# Patient Record
Sex: FEMALE | Race: WHITE | Employment: UNEMPLOYED | URBAN - METROPOLITAN AREA
[De-identification: names, ages, dates, MRNs, and addresses within clinical notes are randomized per-mention and may not be internally consistent; named-entity substitution may affect disease eponyms.]

---

## 2021-02-18 ENCOUNTER — HOSPITAL ENCOUNTER (EMERGENCY)
Age: 51
Discharge: ANOTHER HOSPITAL | End: 2021-02-18
Attending: EMERGENCY MEDICINE
Payer: MEDICAID

## 2021-02-18 ENCOUNTER — APPOINTMENT (OUTPATIENT)
Dept: CT IMAGING | Age: 51
End: 2021-02-18
Payer: MEDICAID

## 2021-02-18 ENCOUNTER — APPOINTMENT (OUTPATIENT)
Dept: GENERAL RADIOLOGY | Age: 51
End: 2021-02-18
Payer: MEDICAID

## 2021-02-18 ENCOUNTER — HOSPITAL ENCOUNTER (INPATIENT)
Age: 51
LOS: 5 days | Discharge: HOME | DRG: 751 | End: 2021-02-23
Attending: PSYCHIATRY & NEUROLOGY | Admitting: PSYCHIATRY & NEUROLOGY
Payer: MEDICAID

## 2021-02-18 VITALS
DIASTOLIC BLOOD PRESSURE: 85 MMHG | TEMPERATURE: 98.8 F | SYSTOLIC BLOOD PRESSURE: 163 MMHG | RESPIRATION RATE: 16 BRPM | HEART RATE: 94 BPM | OXYGEN SATURATION: 98 %

## 2021-02-18 DIAGNOSIS — Z86.79 HISTORY OF HYPERTENSION: ICD-10-CM

## 2021-02-18 DIAGNOSIS — Z86.59 HISTORY OF POST TRAUMATIC STRESS DISORDER: ICD-10-CM

## 2021-02-18 DIAGNOSIS — F23 ACUTE PSYCHOSIS (HCC): Primary | ICD-10-CM

## 2021-02-18 LAB
ACETAMINOPHEN LEVEL: <5 UG/ML (ref 10–30)
ALBUMIN SERPL-MCNC: 4.6 G/DL (ref 3.4–5)
ALP BLD-CCNC: 124 U/L (ref 40–129)
ALT SERPL-CCNC: 26 U/L (ref 10–40)
AMPHETAMINE SCREEN, URINE: ABNORMAL
ANION GAP SERPL CALCULATED.3IONS-SCNC: 13 MMOL/L (ref 3–16)
AST SERPL-CCNC: 24 U/L (ref 15–37)
BARBITURATE SCREEN URINE: ABNORMAL
BASOPHILS ABSOLUTE: 0 K/UL (ref 0–0.2)
BASOPHILS RELATIVE PERCENT: 0.3 %
BENZODIAZEPINE SCREEN, URINE: ABNORMAL
BILIRUB SERPL-MCNC: 0.5 MG/DL (ref 0–1)
BILIRUBIN DIRECT: <0.2 MG/DL (ref 0–0.3)
BILIRUBIN URINE: NEGATIVE
BILIRUBIN, INDIRECT: NORMAL MG/DL (ref 0–1)
BLOOD, URINE: NEGATIVE
BUN BLDV-MCNC: 15 MG/DL (ref 7–20)
CALCIUM SERPL-MCNC: 9.8 MG/DL (ref 8.3–10.6)
CANNABINOID SCREEN URINE: ABNORMAL
CHLORIDE BLD-SCNC: 104 MMOL/L (ref 99–110)
CLARITY: CLEAR
CO2: 21 MMOL/L (ref 21–32)
COCAINE METABOLITE SCREEN URINE: ABNORMAL
COLOR: YELLOW
CREAT SERPL-MCNC: 1.1 MG/DL (ref 0.6–1.1)
EKG ATRIAL RATE: 113 BPM
EKG DIAGNOSIS: NORMAL
EKG P AXIS: 27 DEGREES
EKG P-R INTERVAL: 144 MS
EKG Q-T INTERVAL: 336 MS
EKG QRS DURATION: 88 MS
EKG QTC CALCULATION (BAZETT): 460 MS
EKG R AXIS: 40 DEGREES
EKG T AXIS: 50 DEGREES
EKG VENTRICULAR RATE: 113 BPM
EOSINOPHILS ABSOLUTE: 0.1 K/UL (ref 0–0.6)
EOSINOPHILS RELATIVE PERCENT: 1 %
ETHANOL: NORMAL MG/DL (ref 0–0.08)
GFR AFRICAN AMERICAN: >60
GFR NON-AFRICAN AMERICAN: 52
GLUCOSE BLD-MCNC: 160 MG/DL (ref 70–99)
GLUCOSE URINE: NEGATIVE MG/DL
HCT VFR BLD CALC: 39 % (ref 36–48)
HEMOGLOBIN: 12.7 G/DL (ref 12–16)
KETONES, URINE: NEGATIVE MG/DL
LEUKOCYTE ESTERASE, URINE: NEGATIVE
LIPASE: 18 U/L (ref 13–60)
LYMPHOCYTES ABSOLUTE: 2 K/UL (ref 1–5.1)
LYMPHOCYTES RELATIVE PERCENT: 25.8 %
Lab: ABNORMAL
MCH RBC QN AUTO: 27.3 PG (ref 26–34)
MCHC RBC AUTO-ENTMCNC: 32.7 G/DL (ref 31–36)
MCV RBC AUTO: 83.5 FL (ref 80–100)
METHADONE SCREEN, URINE: ABNORMAL
MICROSCOPIC EXAMINATION: NORMAL
MONOCYTES ABSOLUTE: 0.4 K/UL (ref 0–1.3)
MONOCYTES RELATIVE PERCENT: 4.8 %
NEUTROPHILS ABSOLUTE: 5.4 K/UL (ref 1.7–7.7)
NEUTROPHILS RELATIVE PERCENT: 68.1 %
NITRITE, URINE: NEGATIVE
OPIATE SCREEN URINE: POSITIVE
OXYCODONE URINE: ABNORMAL
PDW BLD-RTO: 14.5 % (ref 12.4–15.4)
PH UA: 7.5
PH UA: 7.5 (ref 5–8)
PHENCYCLIDINE SCREEN URINE: ABNORMAL
PLATELET # BLD: 265 K/UL (ref 135–450)
PMV BLD AUTO: 8.7 FL (ref 5–10.5)
POTASSIUM SERPL-SCNC: 3.7 MMOL/L (ref 3.5–5.1)
PROPOXYPHENE SCREEN: ABNORMAL
PROTEIN UA: NEGATIVE MG/DL
RBC # BLD: 4.67 M/UL (ref 4–5.2)
SALICYLATE, SERUM: <0.3 MG/DL (ref 15–30)
SARS-COV-2, NAAT: NOT DETECTED
SODIUM BLD-SCNC: 138 MMOL/L (ref 136–145)
SPECIFIC GRAVITY UA: 1.01 (ref 1–1.03)
TOTAL PROTEIN: 7.6 G/DL (ref 6.4–8.2)
TROPONIN: <0.01 NG/ML
URINE TYPE: NORMAL
UROBILINOGEN, URINE: 0.2 E.U./DL
WBC # BLD: 8 K/UL (ref 4–11)

## 2021-02-18 PROCEDURE — 80076 HEPATIC FUNCTION PANEL: CPT

## 2021-02-18 PROCEDURE — 6360000002 HC RX W HCPCS: Performed by: PHYSICIAN ASSISTANT

## 2021-02-18 PROCEDURE — 83690 ASSAY OF LIPASE: CPT

## 2021-02-18 PROCEDURE — 74177 CT ABD & PELVIS W/CONTRAST: CPT

## 2021-02-18 PROCEDURE — 82077 ASSAY SPEC XCP UR&BREATH IA: CPT

## 2021-02-18 PROCEDURE — 80307 DRUG TEST PRSMV CHEM ANLYZR: CPT

## 2021-02-18 PROCEDURE — 96374 THER/PROPH/DIAG INJ IV PUSH: CPT

## 2021-02-18 PROCEDURE — 81003 URINALYSIS AUTO W/O SCOPE: CPT

## 2021-02-18 PROCEDURE — 6360000004 HC RX CONTRAST MEDICATION: Performed by: PHYSICIAN ASSISTANT

## 2021-02-18 PROCEDURE — 70450 CT HEAD/BRAIN W/O DYE: CPT

## 2021-02-18 PROCEDURE — 71045 X-RAY EXAM CHEST 1 VIEW: CPT

## 2021-02-18 PROCEDURE — 96375 TX/PRO/DX INJ NEW DRUG ADDON: CPT

## 2021-02-18 PROCEDURE — 80143 DRUG ASSAY ACETAMINOPHEN: CPT

## 2021-02-18 PROCEDURE — 99284 EMERGENCY DEPT VISIT MOD MDM: CPT

## 2021-02-18 PROCEDURE — 96361 HYDRATE IV INFUSION ADD-ON: CPT

## 2021-02-18 PROCEDURE — 2580000003 HC RX 258: Performed by: PHYSICIAN ASSISTANT

## 2021-02-18 PROCEDURE — 84484 ASSAY OF TROPONIN QUANT: CPT

## 2021-02-18 PROCEDURE — 80048 BASIC METABOLIC PNL TOTAL CA: CPT

## 2021-02-18 PROCEDURE — 85025 COMPLETE CBC W/AUTO DIFF WBC: CPT

## 2021-02-18 PROCEDURE — 1240000000 HC EMOTIONAL WELLNESS R&B

## 2021-02-18 PROCEDURE — 80179 DRUG ASSAY SALICYLATE: CPT

## 2021-02-18 PROCEDURE — 93005 ELECTROCARDIOGRAM TRACING: CPT | Performed by: PHYSICIAN ASSISTANT

## 2021-02-18 RX ORDER — IRBESARTAN 150 MG/1
150 TABLET ORAL NIGHTLY
Status: ON HOLD | COMMUNITY
End: 2021-02-23 | Stop reason: SDUPTHER

## 2021-02-18 RX ORDER — MORPHINE SULFATE 2 MG/ML
2 INJECTION, SOLUTION INTRAMUSCULAR; INTRAVENOUS ONCE
Status: COMPLETED | OUTPATIENT
Start: 2021-02-18 | End: 2021-02-18

## 2021-02-18 RX ORDER — TACROLIMUS 1 MG/1
3 CAPSULE ORAL 2 TIMES DAILY
Status: ON HOLD | COMMUNITY
End: 2021-02-23 | Stop reason: SDUPTHER

## 2021-02-18 RX ORDER — ARIPIPRAZOLE 5 MG/1
5 TABLET ORAL DAILY
Status: ON HOLD | COMMUNITY
End: 2021-02-23 | Stop reason: HOSPADM

## 2021-02-18 RX ORDER — TOPIRAMATE 25 MG/1
25 TABLET ORAL 2 TIMES DAILY
Status: ON HOLD | COMMUNITY
End: 2021-02-23 | Stop reason: SDUPTHER

## 2021-02-18 RX ORDER — 0.9 % SODIUM CHLORIDE 0.9 %
1000 INTRAVENOUS SOLUTION INTRAVENOUS ONCE
Status: COMPLETED | OUTPATIENT
Start: 2021-02-18 | End: 2021-02-18

## 2021-02-18 RX ORDER — ONDANSETRON 2 MG/ML
4 INJECTION INTRAMUSCULAR; INTRAVENOUS ONCE
Status: COMPLETED | OUTPATIENT
Start: 2021-02-18 | End: 2021-02-18

## 2021-02-18 RX ORDER — VILAZODONE HYDROCHLORIDE 40 MG/1
80 TABLET ORAL DAILY
Status: ON HOLD | COMMUNITY
End: 2021-02-23 | Stop reason: SDUPTHER

## 2021-02-18 RX ORDER — AMLODIPINE BESYLATE 5 MG/1
5 TABLET ORAL DAILY
Status: ON HOLD | COMMUNITY
End: 2021-02-23 | Stop reason: SDUPTHER

## 2021-02-18 RX ADMIN — IOPAMIDOL 80 ML: 755 INJECTION, SOLUTION INTRAVENOUS at 13:26

## 2021-02-18 RX ADMIN — ONDANSETRON 4 MG: 2 INJECTION INTRAMUSCULAR; INTRAVENOUS at 13:15

## 2021-02-18 RX ADMIN — SODIUM CHLORIDE 1000 ML: 0.9 INJECTION, SOLUTION INTRAVENOUS at 13:14

## 2021-02-18 RX ADMIN — MORPHINE SULFATE 2 MG: 2 INJECTION, SOLUTION INTRAMUSCULAR; INTRAVENOUS at 13:15

## 2021-02-18 ASSESSMENT — ENCOUNTER SYMPTOMS
COUGH: 1
VOMITING: 0
NAUSEA: 1
ABDOMINAL PAIN: 1

## 2021-02-18 ASSESSMENT — SLEEP AND FATIGUE QUESTIONNAIRES
DO YOU HAVE DIFFICULTY SLEEPING: NO
DO YOU USE A SLEEP AID: NO

## 2021-02-18 ASSESSMENT — PAIN DESCRIPTION - LOCATION: LOCATION: ABDOMEN

## 2021-02-18 ASSESSMENT — LIFESTYLE VARIABLES: HISTORY_ALCOHOL_USE: NO

## 2021-02-18 ASSESSMENT — PAIN SCALES - GENERAL: PAINLEVEL_OUTOF10: 8
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
## 2021-02-19 PROCEDURE — 1240000000 HC EMOTIONAL WELLNESS R&B

## 2021-02-19 PROCEDURE — 6370000000 HC RX 637 (ALT 250 FOR IP): Performed by: PSYCHIATRY & NEUROLOGY

## 2021-02-19 PROCEDURE — 6360000002 HC RX W HCPCS: Performed by: PSYCHIATRY & NEUROLOGY

## 2021-02-19 PROCEDURE — 6370000000 HC RX 637 (ALT 250 FOR IP): Performed by: PHYSICIAN ASSISTANT

## 2021-02-19 PROCEDURE — 99222 1ST HOSP IP/OBS MODERATE 55: CPT | Performed by: PHYSICIAN ASSISTANT

## 2021-02-19 PROCEDURE — 99223 1ST HOSP IP/OBS HIGH 75: CPT | Performed by: PSYCHIATRY & NEUROLOGY

## 2021-02-19 RX ORDER — ARIPIPRAZOLE 10 MG/1
10 TABLET ORAL DAILY
Status: DISCONTINUED | OUTPATIENT
Start: 2021-02-19 | End: 2021-02-23 | Stop reason: HOSPADM

## 2021-02-19 RX ORDER — TRAZODONE HYDROCHLORIDE 50 MG/1
50 TABLET ORAL NIGHTLY PRN
Status: DISCONTINUED | OUTPATIENT
Start: 2021-02-19 | End: 2021-02-23 | Stop reason: HOSPADM

## 2021-02-19 RX ORDER — HYDROXYZINE 50 MG/1
50 TABLET, FILM COATED ORAL 3 TIMES DAILY PRN
Status: DISCONTINUED | OUTPATIENT
Start: 2021-02-19 | End: 2021-02-19 | Stop reason: CLARIF

## 2021-02-19 RX ORDER — IRBESARTAN 150 MG/1
150 TABLET ORAL NIGHTLY
Status: DISCONTINUED | OUTPATIENT
Start: 2021-02-19 | End: 2021-02-19

## 2021-02-19 RX ORDER — TACROLIMUS 1 MG/1
3 CAPSULE ORAL 2 TIMES DAILY
Status: DISCONTINUED | OUTPATIENT
Start: 2021-02-19 | End: 2021-02-23 | Stop reason: HOSPADM

## 2021-02-19 RX ORDER — MAGNESIUM HYDROXIDE/ALUMINUM HYDROXICE/SIMETHICONE 120; 1200; 1200 MG/30ML; MG/30ML; MG/30ML
30 SUSPENSION ORAL EVERY 6 HOURS PRN
Status: DISCONTINUED | OUTPATIENT
Start: 2021-02-19 | End: 2021-02-23 | Stop reason: HOSPADM

## 2021-02-19 RX ORDER — IBUPROFEN 400 MG/1
400 TABLET ORAL EVERY 6 HOURS PRN
Status: DISCONTINUED | OUTPATIENT
Start: 2021-02-19 | End: 2021-02-23 | Stop reason: HOSPADM

## 2021-02-19 RX ORDER — LOSARTAN POTASSIUM 25 MG/1
50 TABLET ORAL NIGHTLY
Status: DISCONTINUED | OUTPATIENT
Start: 2021-02-19 | End: 2021-02-23 | Stop reason: HOSPADM

## 2021-02-19 RX ORDER — VILAZODONE HYDROCHLORIDE 40 MG/1
80 TABLET ORAL DAILY
Status: DISCONTINUED | OUTPATIENT
Start: 2021-02-19 | End: 2021-02-23 | Stop reason: HOSPADM

## 2021-02-19 RX ORDER — OLANZAPINE 10 MG/1
10 TABLET ORAL
Status: ACTIVE | OUTPATIENT
Start: 2021-02-19 | End: 2021-02-19

## 2021-02-19 RX ORDER — BENZTROPINE MESYLATE 1 MG/ML
2 INJECTION INTRAMUSCULAR; INTRAVENOUS 2 TIMES DAILY PRN
Status: DISCONTINUED | OUTPATIENT
Start: 2021-02-19 | End: 2021-02-23 | Stop reason: HOSPADM

## 2021-02-19 RX ORDER — ACETAMINOPHEN 325 MG/1
650 TABLET ORAL EVERY 4 HOURS PRN
Status: DISCONTINUED | OUTPATIENT
Start: 2021-02-19 | End: 2021-02-23 | Stop reason: HOSPADM

## 2021-02-19 RX ORDER — HYDROXYZINE HYDROCHLORIDE 10 MG/1
50 TABLET, FILM COATED ORAL 3 TIMES DAILY PRN
Status: DISCONTINUED | OUTPATIENT
Start: 2021-02-19 | End: 2021-02-23 | Stop reason: HOSPADM

## 2021-02-19 RX ORDER — OLANZAPINE 10 MG/1
10 INJECTION, POWDER, LYOPHILIZED, FOR SOLUTION INTRAMUSCULAR
Status: ACTIVE | OUTPATIENT
Start: 2021-02-19 | End: 2021-02-19

## 2021-02-19 RX ORDER — TOPIRAMATE 25 MG/1
25 TABLET ORAL 2 TIMES DAILY
Status: DISCONTINUED | OUTPATIENT
Start: 2021-02-19 | End: 2021-02-23 | Stop reason: HOSPADM

## 2021-02-19 RX ORDER — AMLODIPINE BESYLATE 5 MG/1
5 TABLET ORAL DAILY
Status: DISCONTINUED | OUTPATIENT
Start: 2021-02-19 | End: 2021-02-19

## 2021-02-19 RX ORDER — TACROLIMUS 1 MG/1
3 CAPSULE ORAL 2 TIMES DAILY
Status: DISCONTINUED | OUTPATIENT
Start: 2021-02-19 | End: 2021-02-19

## 2021-02-19 RX ORDER — AMLODIPINE BESYLATE 5 MG/1
5 TABLET ORAL DAILY
Status: DISCONTINUED | OUTPATIENT
Start: 2021-02-19 | End: 2021-02-23 | Stop reason: HOSPADM

## 2021-02-19 RX ADMIN — VILAZODONE HYDROCHLORIDE 80 MG: 40 TABLET ORAL at 12:29

## 2021-02-19 RX ADMIN — AMLODIPINE BESYLATE 5 MG: 5 TABLET ORAL at 12:29

## 2021-02-19 RX ADMIN — LOSARTAN POTASSIUM 50 MG: 25 TABLET, FILM COATED ORAL at 20:56

## 2021-02-19 RX ADMIN — TRAZODONE HYDROCHLORIDE 50 MG: 50 TABLET ORAL at 20:55

## 2021-02-19 RX ADMIN — ARIPIPRAZOLE 10 MG: 10 TABLET ORAL at 12:29

## 2021-02-19 RX ADMIN — TACROLIMUS 3 MG: 1 CAPSULE ORAL at 20:55

## 2021-02-19 RX ADMIN — TOPIRAMATE 25 MG: 25 TABLET, FILM COATED ORAL at 12:29

## 2021-02-19 RX ADMIN — TOPIRAMATE 25 MG: 25 TABLET, FILM COATED ORAL at 20:55

## 2021-02-19 RX ADMIN — TACROLIMUS 3 MG: 1 CAPSULE ORAL at 15:58

## 2021-02-19 ASSESSMENT — SLEEP AND FATIGUE QUESTIONNAIRES
DO YOU USE A SLEEP AID: NO
AVERAGE NUMBER OF SLEEP HOURS: 7
DO YOU HAVE DIFFICULTY SLEEPING: NO
AVERAGE NUMBER OF SLEEP HOURS: 6

## 2021-02-19 ASSESSMENT — LIFESTYLE VARIABLES: HISTORY_ALCOHOL_USE: NO

## 2021-02-19 ASSESSMENT — PAIN SCALES - GENERAL: PAINLEVEL_OUTOF10: 0

## 2021-02-19 NOTE — FLOWSHEET NOTE
02/19/21 0843   Activities of Daily Living   Patient Requires assistance with daily self-care activities? No   Leisure Activity 1   3 Favorite Leisure Activities \"Go for long walks\"   Frequency weekly   Last time can't remember   Barriers to participating  motivation  (Weather permitting)   Leisure Activity 2   [address]  \"Reading\"   Frequency  <2 hours/day   Last time  this week   Leisure Activity 3   [address]  \"Cooking\"   Frequency  <2 hours/day   Last time  this week   Social   Patient reports spending the majority of their free time with a group   Patient verbalizes a preference for spending free time with a group   Patients perception of support system more healthy   Patients perception of barriers to socializing with others include(s) no perceived barriers   Social Details Support System: \"my two daughters\"   Beliefs & Coping   Has difficulty dealing with feelings   No   Internalizes feelings/Keeps feelings in No   Externalizes feelings through aggressiveness or poor temper control  No   Feels uncomfortable around others  No   Has difficulty talking to others  No   Depends on others for direction or decisions No   Difficulty dealing with anger of others  No   Difficulty dealing with own anger  No   Difficulty managing stress No   Frequently has difficulty with relationships  No   Has recently perceived/experienced loss, disappointment, humiliation or failure  Yes   General perception about self likes self   Attitude about abilities occasionally fails   Locus of Control  always   Belief about recovery I don't have an illness/problem   Patient Identified Strengths  \"I listen to people and I don't \"   Patient Identified Limitations  \"I'm struggling with trusting people\"   Perception of most stressful event prior to hospitalization \"I don't know why I'm here. I already have a therapist and psychiatrist I see at home. \"   Perception of changes needed \"I don't know\" Strengths and Limitations   Strengths Independent in basic self-care activities; Positive support network;Demonstrates basic social skills   Limitations Tendency to isolate self     CTRS completed pt's AT/OT Leisure Assessment.     Maria A Zarco, CTRS

## 2021-02-19 NOTE — FLOWSHEET NOTE
21 1028   Psychiatric History   Psychiatric history treatment Current treatment   Are there any medication issues? No   Support System   Support system Adequate   Types of Support System Other (Comment)  (daughter)   Problems in support system None   Current Living Situation   Home Living Adequate   Living information Lives with others  (pt lives in [place] and was visiting a daughter that lives here. pt lives with another daughter in [place] and her . grand daughter also lives there)   Problems with living situation  No   Lack of basic needs No   SSDI/SSI none   Other government assistance none   Problems with environment no issues   Current abuse issues none   Supervised setting None   Relationship problems No   Medical and Self-Care Issues   Relevant medical problems arthritis   Relevant self-care issues no issues   Barriers to treatment No  (pt has a therapist in [place]. she is in process of changing practices so pt needs to reconnect with her)   Family Constellation   Spouse/partner-name/age  for 6 years this april   Children-names/ages 3 daughters that are all grown and    Parents . mom  when pt was becoming a teenager   Siblings 2 brothers in [facility] and another brother that passed away 5 years ago   Support services Agency involved(Comment)   Comment pt has a therapist in [place] where pt lives   Childhood   Raised by Biological father   Biological mother pt  when pt teenager   Biological father raised by father and by mother's aunt   Relevant family history pt was born and raised in [place].  pt was  in [place] and then moved to [place]   History of abuse No   Legal History   Legal history No   Other relevant legal issues none   Juvenile legal history No    Abuse Assessment   Verbal Abuse Denies   Emotional abuse Denies   Financial Abuse Denies   Sexual abuse Denies   Elder abuse No   Substance Use   Use of substances  No   Education Education HS graduate -GED  (finished HS in the states. pt has 2 years of college)   Special education   (none)   Work History   Currently employed No   Recent job loss or change   (none)    service   (none)   /VA involvement none   Leisure/Activity   Past interests gardening, cooking, reading   Present interests same   Current daily activity walk at least 2 times a week, reading, cooking   Social with friends/family Yes   Cultural and Spiritual   Spiritual concerns No   Cultural concerns No   Comment pt was born in [facility] met with pt and completed assessments. Pt reported that she is from [facility] where she lives with one daughter and she is here visiting with another daughter. She reported that she does not think that she made it to her house and she is confused to why she is here. Pt thinks she was having confusion on the plane. Pt reported that she is supposed to be visiting for 2 weeks and leaving March 2nd. Pt reported that she has had depression in the past since a teen ager. She reported that she has a therapist and psychiatrist in [place]. Pt reported that her psychiatrist has tested her for dementia but she has tested negative. Pt reported that there is nothing new or different that she can remember or stressors. Pt reported that she is confused and having difficulty remembering.  Pt denies SI.

## 2021-02-19 NOTE — BH NOTE
hospitals  was invited and encouraged to attend [facility]. Pt did not attend.     PAIGE Cuevas

## 2021-02-19 NOTE — H&P
Hospital Medicine History & Physical      PCP: No primary care provider on file. Date of Admission: 2/18/2021    Date of Service: Pt seen/examined on 2/19/2021    Chief Complaint:  No chief complaint on file. History Of Present Illness: The patient is a 45 y.o. female with a PMH of Anxiety, Depression, HTN and RA who presented to [facility] for acute psychosis. Patient was seen and evaluated in the ED by the ED medical provider, patient was medically cleared for admission to [facility] at [facility]. This note serves as an admission medical H&P.   PCP: unable to recall provider name - pt's PCP is in [place], [place]  Tobacco Use: None  EtOH Use: None  Illicit Drug Use: None, UDS + opiates    Pt denies any medical concerns at this time. Past Medical History:        Diagnosis Date    Anxiety     Depression     Hypertension     PTSD (post-traumatic stress disorder)     RA (rheumatoid arthritis) ([facility] [address]        Past Surgical History:        Procedure Laterality Date    CHOLECYSTECTOMY      HYSTERECTOMY         Medications Prior to Admission:    Prior to Admission medications    Medication Sig Start Date End Date Taking? Authorizing Provider   irbesartan (AVAPRO) 150 MG tablet Take 150 mg by mouth nightly    Historical Provider, MD   vilazodone HCl (VILAZODONE HCL) 40 MG TABS Take 80 mg by mouth daily    Historical Provider, MD   topiramate (TOPAMAX) 25 MG tablet Take 25 mg by mouth 2 times daily    Historical Provider, MD   ARIPiprazole (ABILIFY) 5 MG tablet Take 5 mg by mouth daily    Historical Provider, MD   amLODIPine (NORVASC) 5 MG tablet Take 5 mg by mouth daily    Historical Provider, MD   tacrolimus (PROGRAF) 1 MG capsule Take 3 capsules by mouth 2 times daily    Historical Provider, MD       Allergies:  Patient has no known allergies. Social History:  The patient currently lives with her daughter and son in law. TOBACCO:   reports that she has never smoked.  She has never used smokeless tobacco.  ETOH:   reports previous alcohol use. Family History:   Positive as follows:        Problem Relation Age of Onset    Cancer Mother     Cancer Brother     Cancer Maternal Grandmother     Alcohol Abuse Maternal Grandfather        REVIEW OF SYSTEMS:     Constitutional: Negative for fever   HENT: Negative for sore throat   Eyes: Negative for redness   Respiratory: Negative  for dyspnea, cough   Cardiovascular: Negative for chest pain   Gastrointestinal: Negative for vomiting, diarrhea   Genitourinary: Negative for hematuria   Musculoskeletal: Negative for arthralgias   Skin: Negative for rash   Neurological: Negative for syncope   Hematological: Negative for adenopathy   Psychiatric/Behavorial: Negative for anxiety    PHYSICAL EXAM:    BP (!) 143/95   Pulse 108   Temp 97.8 °F (36.6 °C) (Oral)   Resp 16   Ht 5' 4\" (1.626 m)   Wt 200 lb (90.7 kg)   LMP  (LMP Unknown)   SpO2 98%   Breastfeeding No   BMI 34.33 kg/m²   Gen: No distress. Alert. Middle aged female  Eyes: PERRL. No sclera icterus. No conjunctival injection. ENT: No discharge. Pharynx clear. Neck:  Trachea midline. Resp: No accessory muscle use. No crackles. No wheezes. No rhonchi. On RA  CV: Regular rate. Regular rhythm. No murmur. No rub. No edema. GI: Soft, Non-tender. Non-distended. Normal bowel sounds. Skin: Warm and dry. No rash on exposed extremities. M/S: No cyanosis. No clubbing. Neuro: Awake.  Grossly nonfocal, CN II-XII intact    Psych: Defer to psychiatry    CBC:   Recent Labs     02/18/21  1229   WBC 8.0   HGB 12.7   HCT 39.0   MCV 83.5        BMP:   Recent Labs     02/18/21  1229      K 3.7      CO2 21   BUN 15   CREATININE 1.1     LIVER PROFILE:   Recent Labs     02/18/21  1229   AST 24   ALT 26   LIPASE 18.0   BILIDIR <0.2   BILITOT 0.5   ALKPHOS 124     UA:  Recent Labs     02/18/21  1352   COLORU Yellow   PHUR 7.5  7.5   CLARITYU Clear   SPECGRAV 1.015   LEUKOCYTESUR Negative UROBILINOGEN 0.2   BILIRUBINUR Negative   BLOODU Negative   GLUCOSEU Negative        CARDIAC ENZYMES  Recent Labs     02/18/21  1229   TROPONINI <0.01       U/A:    Lab Results   Component Value Date    COLORU Yellow 02/18/2021    CLARITYU Clear 02/18/2021    SPECGRAV 1.015 02/18/2021    LEUKOCYTESUR Negative 02/18/2021    BLOODU Negative 02/18/2021    GLUCOSEU Negative 02/18/2021     CULTURES  None    EKG:  I have reviewed the EKG with the following interpretation:   Sinus tachycardia rate of 113  No prior EKGs available for review    RADIOLOGY  None    Pertinent previous results reviewed     CT head 2/18/2021  1. No intracranial hemorrhage, mass effect or large acute territorial infarction    CT abdomen 2/18/2021  1. No acute abnormality of the abdomen or pelvis. 2. Surgical absence of the gallbladder. 3. Normal appendix. 4. Hepatic steatosis. CXR 2/18/2021  No acute cardiopulmonary abnormality. ASSESSMENT/PLAN:    Acute Psychosis  - cont mgmt per [facility]    HTN  - stable  - cont Amlodipine and Irbesartan    Rheumatoid Arthritis  - no flares  - cont Tacrolimus  - on Kevzara IM every 2 weeks    Obesity  - Body mass index is 34.33 kg/m². - Counseled on weight loss. Pt has no medical complaints at this time. Pt was informed that they may have [facility] contact us should any medical concerns arise during this admission.     Candice Pen PA-C 11:40 AM 2/19/2021

## 2021-02-19 NOTE — PROGRESS NOTES
Behavioral Services  Medicare Certification Upon Admission    I certify that this patient's inpatient psychiatric hospital admission is medically necessary for:   X (1) Treatment which could reasonably be expected to improve this patient's condition,      X (2) Or for diagnostic study;     AND    X (2) The inpatient psychiatric services are provided while the individual is under the care of a physician and are included in the individualized plan of care.     Estimated length of stay/service 2-3 days     Plan for post-hospital care outpt tx    Electronically signed by Annalisa Choi MD on 2/19/2021 at 11:51 AM

## 2021-02-19 NOTE — ED NOTES
EMTALA documents and Hold documents completed and sent with patient.      Marian Child RN  02/18/21 5691
Manan lantigua took report, explained pt background and evaluation, no further question asked     Gloria Alonso RN  02/18/21 0319
Patient transferred to [facility] with [address] [facility].       Dakotah Cardona RN  02/18/21 4694
.
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
## 2021-02-19 NOTE — BH NOTE
..   `[facility]  Admission Note   Arrived on unit per stretch with 2 ambulance attendants at approx 2300. Admission Type:   Admission Type:  Involuntary    Reason for admission:  Reason for Admission: confusion, possible hallucinations/psychosis    PATIENT STRENGTHS:  Strengths: Communication, Social Skills, Medication Compliance, Positive Support    Patient Strengths and Limitations:  Limitations: Tendency to isolate self, Lacks leisure interests    Addictive Behavior:   Addictive Behavior  In the past 3 months, have you felt or has someone told you that you have a problem with:  : None  Do you have a history of Chemical Use?: No  Do you have a history of Alcohol Use?: No  Do you have a history of Street Drug Abuse?: No  Histroy of Prescripton Drug Abuse?: No    Medical Problems:   Past Medical History:   Diagnosis Date    Anxiety     Depression     Hypertension     PTSD (post-traumatic stress disorder)     RA (rheumatoid arthritis) ([facility] [address]        Status EXAM:  Status and Exam  Normal: No  Facial Expression: Worried, Sad  Affect: Blunt  Level of Consciousness: Alert  Mood:Normal: No  Mood: Anxious, Labile(at times laughs inappropriately)  Motor Activity:Normal: Yes  Interview Behavior: Cooperative  Preception: [place] [employer] to Person, [employer] to Time, [place] [employer] to Place  Attention:Normal: No  Attention: Distractible, Unable to Concentrate  Thought Processes: (linear)  Thought Content:Normal: No  Thought Content: Paranoia, Preoccupations  Hallucinations: None(denies, but appears to RTIS)  Delusions: Yes  Delusions: Persecution  Memory:Normal: No  Memory: Poor Recent  Insight and Judgment: No  Present Suicidal Ideation: No  Present Homicidal Ideation: No    Tobacco Screening:  Practical Counseling, on admission, aleisha X, if applicable and completed (first 3 are required if patient doesn't refuse):            ( )  Recognizing danger situations (included triggers and roadblocks)                    ( )  Coping

## 2021-02-20 LAB — TSH SERPL DL<=0.05 MIU/L-ACNC: 0.51 UIU/ML (ref 0.27–4.2)

## 2021-02-20 PROCEDURE — 84443 ASSAY THYROID STIM HORMONE: CPT

## 2021-02-20 PROCEDURE — 1240000000 HC EMOTIONAL WELLNESS R&B

## 2021-02-20 PROCEDURE — 6370000000 HC RX 637 (ALT 250 FOR IP): Performed by: PSYCHIATRY & NEUROLOGY

## 2021-02-20 PROCEDURE — 80061 LIPID PANEL: CPT

## 2021-02-20 PROCEDURE — 6370000000 HC RX 637 (ALT 250 FOR IP): Performed by: PHYSICIAN ASSISTANT

## 2021-02-20 PROCEDURE — 6360000002 HC RX W HCPCS: Performed by: PSYCHIATRY & NEUROLOGY

## 2021-02-20 PROCEDURE — 36415 COLL VENOUS BLD VENIPUNCTURE: CPT

## 2021-02-20 PROCEDURE — 83036 HEMOGLOBIN GLYCOSYLATED A1C: CPT

## 2021-02-20 PROCEDURE — 99233 SBSQ HOSP IP/OBS HIGH 50: CPT | Performed by: PSYCHIATRY & NEUROLOGY

## 2021-02-20 RX ADMIN — TOPIRAMATE 25 MG: 25 TABLET, FILM COATED ORAL at 21:29

## 2021-02-20 RX ADMIN — LOSARTAN POTASSIUM 50 MG: 25 TABLET, FILM COATED ORAL at 21:29

## 2021-02-20 RX ADMIN — TACROLIMUS 3 MG: 1 CAPSULE ORAL at 08:54

## 2021-02-20 RX ADMIN — VILAZODONE HYDROCHLORIDE 80 MG: 40 TABLET ORAL at 08:55

## 2021-02-20 RX ADMIN — TOPIRAMATE 25 MG: 25 TABLET, FILM COATED ORAL at 08:55

## 2021-02-20 RX ADMIN — ARIPIPRAZOLE 10 MG: 10 TABLET ORAL at 08:55

## 2021-02-20 RX ADMIN — AMLODIPINE BESYLATE 5 MG: 5 TABLET ORAL at 08:55

## 2021-02-20 RX ADMIN — TACROLIMUS 3 MG: 1 CAPSULE ORAL at 21:29

## 2021-02-20 NOTE — PLAN OF CARE
Problem: Altered Mood, Depressive Behavior:  Goal: Able to verbalize and/or display a decrease in depressive symptoms  Description: Able to verbalize and/or display a decrease in depressive symptoms  2/20/2021 0917 by Dyana Walker LPN  Outcome: Ongoing  Note: Continues to be withdrawn and not participate in therapy     Problem: Altered Mood, Depressive Behavior:  Goal: Able to verbalize support systems  Description: Able to verbalize support systems  2/20/2021 0917 by Dyana Walker LPN  Outcome: Met This Shift  Note: Daughter supportive and has called to talk with Corrie. Problem: Altered Mood, Psychotic Behavior:  Goal: Able to demonstrate trust by eating, participating in treatment and following staff's direction  Description: Able to demonstrate trust by eating, participating in treatment and following staff's direction  Outcome: Ongoing  Note: Withdrawn to assigned room. Out of room to retreive meal and ate in room then returned tray to cart. Diet and fluids taken good. Medication compliant. Did not attend group but laid in bed to sleep after morning meal. Did attend group after meeting with provider. Eye contact good during interaction. Voice tone even, Not giddy.

## 2021-02-20 NOTE — GROUP NOTE
Group Therapy Note    Date: 2/19/2021    Group Start Time: 2010  Group End Time: 2040  Group Topic: Wrap-Up    [facility]        Group Therapy Note    Attendees: Goals and importance of goal setting discussed. Night time milieu activities discussed. Patient's Goal:  Sleep     Notes:  Successful     Status After Intervention:  Improved    Participation Level:  Active Listener and Interactive    Participation Quality: Appropriate and Attentive      Speech:  normal      Thought Process/Content: Logical  Linear      Affective Functioning: Congruent      Mood: depressed      Level of consciousness:  Alert and Oriented x4      Response to Learning: Progressing to goal      Endings: None Reported    Modes of Intervention: Support      Discipline Responsible: Behavorial Health Tech      Signature:  Eduin Esquivel

## 2021-02-20 NOTE — GROUP NOTE
Group Therapy Note    Date: 2/20/2021    Group Start Time: 1330  Group End Time: 1430  Group Topic: Psychoeducation    [address] [facility] [place]    Group Therapy Note    Attendees: 10    Patients learned about the benefits of practicing gratitude on mental, physical, and psychological health. Patients discussed and learn about ways to practice gratitude to increased positive thinking. Patients engaged in a self directed positive mindfulness activity at the end of group and discussed ways they could start to incorporate gratitude into their lives. Notes:  Pt was engaged in group and participated fully in group discussion. Status After Intervention:  Improved    Participation Level:  Active Listener and Interactive    Participation Quality: Appropriate and Attentive      Speech:  normal      Thought Process/Content: Linear      Affective Functioning: Constricted/Restricted      Mood: anxious and depressed      Level of consciousness:  Alert and Oriented x4      Response to Learning: Able to verbalize current knowledge/experience, Able to verbalize/acknowledge new learning and Able to retain information      Endings: None Reported    Modes of Intervention: Education, Support, Socialization, Exploration, Clarifying, Problem-solving and Activity      Discipline Responsible: /Counselor      Signature:  RICARDO Shelby-S, R-BHAVIK

## 2021-02-20 NOTE — PROGRESS NOTES
Department of Psychiatry  AttendingProgress Note  Chief Complaint: psychosis  Nurys Baugh has been cooperative in program. She stated that she slept well and is eating. She has had contact with both daughters. Going to groups and appears to interact with peers. Feels safe here and no SI/HI nor A/V baldwin  She doesn't appear to have much insight into her illness and she appears unconcerned. Patient's chart was reviewed and collaborated with  about the treatment plan. SUBJECTIVE:    Patient is feeling better. Suicidal ideation:  denies suicidal ideation. Patient does not have medication side effects. ROS: Patient has new complaints: no  Sleeping adequately:  Yes   Appetite adequate: Yes  Attending groups: Yes  Visitors:No    OBJECTIVE    Physical  VITALS:  /64   Pulse 94   Temp 98.2 °F (36.8 °C) (Temporal)   Resp 18   Ht 5' 4\" (1.626 m)   Wt 200 lb (90.7 kg)   LMP  (LMP Unknown)   SpO2 97%   Breastfeeding No   BMI 34.33 kg/m²     Mental Status Examination:  Patients appearance was street clothes. Thoughts are [place]. Homicidal ideations none. No abnormal movements, tics or mannerisms. Memory intact Aims 0. Concentration Fair. Alert and oriented X 4. Insight and Judgement impaired insight. Patient was cooperative.  Patient gait normal. Mood constricted, affect flat affect Hallucinations Absent, suicidal ideations no specific plan to harm self Speech soft  Data  Labs:   Admission on 02/18/2021   Component Date Value Ref Range Status    TSH 02/20/2021 0.51  0.27 - 4.20 uIU/mL Final            Medications  Current Facility-Administered Medications: influenza quadrivalent split vaccine (FLUZONE;FLUARIX;FLULAVAL;AFLURIA) injection 0.5 mL, 0.5 mL, Intramuscular, Prior to discharge  acetaminophen (TYLENOL) tablet 650 mg, 650 mg, Oral, Q4H PRN  ibuprofen (ADVIL;MOTRIN) tablet 400 mg, 400 mg, Oral, Q6H PRN  magnesium hydroxide (MILK OF MAGNESIA) 400 MG/5ML suspension 30 mL, 30 mL, Oral, Daily PRN  aluminum & magnesium hydroxide-simethicone (MAALOX) 200-200-20 MG/5ML suspension 30 mL, 30 mL, Oral, Q6H PRN  sterile water injection 2.1 mL, 2.1 mL, Intramuscular, Q4H PRN  traZODone (DESYREL) tablet 50 mg, 50 mg, Oral, Nightly PRN  benztropine mesylate (COGENTIN) injection 2 mg, 2 mg, Intramuscular, BID PRN  hydrOXYzine (ATARAX) tablet 50 mg, 50 mg, Oral, TID PRN  amLODIPine (NORVASC) tablet 5 mg, 5 mg, Oral, Daily  losartan (COZAAR) tablet 50 mg, 50 mg, Oral, Nightly  ARIPiprazole (ABILIFY) tablet 10 mg, 10 mg, Oral, Daily  tacrolimus (PROGRAF) capsule 3 mg, 3 mg, Oral, BID  topiramate (TOPAMAX) tablet 25 mg, 25 mg, Oral, BID  vilazodone HCl (VIIBRYD) TABS 80 mg, 80 mg, Oral, Daily    ASSESSMENT AND PLAN    Principal Problem:    Acute psychosis ([facility] [address]  Active Problems:    Essential hypertension    Rheumatoid arthritis in remission ([facility] [address]    Obesity (BMI 30.0-34. 9)  Resolved Problems:    * No resolved hospital problems. *       1. Patient s symptoms   are improving. Abilify 10 mg QD  2. Probable discharge is next week  3. Discharge planning is incomplete  4. Suicidal ideation is none  5. Total time with patient was 40 minutes and more than 50 % of that time was spent counseling the patient on their symptoms, treatment and expected goals.

## 2021-02-21 LAB
CHOLESTEROL, TOTAL: 165 MG/DL (ref 0–199)
ESTIMATED AVERAGE GLUCOSE: 137 MG/DL
HBA1C MFR BLD: 6.4 %
HDLC SERPL-MCNC: 41 MG/DL (ref 40–60)
LDL CHOLESTEROL CALCULATED: 100 MG/DL
TRIGL SERPL-MCNC: 119 MG/DL (ref 0–150)
VLDLC SERPL CALC-MCNC: 24 MG/DL

## 2021-02-21 PROCEDURE — 1240000000 HC EMOTIONAL WELLNESS R&B

## 2021-02-21 PROCEDURE — 6360000002 HC RX W HCPCS: Performed by: PSYCHIATRY & NEUROLOGY

## 2021-02-21 PROCEDURE — 6370000000 HC RX 637 (ALT 250 FOR IP): Performed by: PSYCHIATRY & NEUROLOGY

## 2021-02-21 PROCEDURE — 6370000000 HC RX 637 (ALT 250 FOR IP): Performed by: PHYSICIAN ASSISTANT

## 2021-02-21 RX ADMIN — VILAZODONE HYDROCHLORIDE 80 MG: 40 TABLET ORAL at 09:52

## 2021-02-21 RX ADMIN — TOPIRAMATE 25 MG: 25 TABLET, FILM COATED ORAL at 21:20

## 2021-02-21 RX ADMIN — LOSARTAN POTASSIUM 50 MG: 25 TABLET, FILM COATED ORAL at 21:20

## 2021-02-21 RX ADMIN — AMLODIPINE BESYLATE 5 MG: 5 TABLET ORAL at 09:52

## 2021-02-21 RX ADMIN — TACROLIMUS 3 MG: 1 CAPSULE ORAL at 09:53

## 2021-02-21 RX ADMIN — ARIPIPRAZOLE 10 MG: 10 TABLET ORAL at 09:52

## 2021-02-21 RX ADMIN — TOPIRAMATE 25 MG: 25 TABLET, FILM COATED ORAL at 09:52

## 2021-02-21 RX ADMIN — TACROLIMUS 3 MG: 1 CAPSULE ORAL at 21:20

## 2021-02-21 NOTE — GROUP NOTE
Group Therapy Note    Date: 2/21/2021    Group Start Time: 1030  Group End Time: 1200  Group Topic: [facility]        Group Therapy Note    Music Therapy group consisted of heaven analysis intervention and creative arts intervention. Therapist facilitated group discussion of perspectives, challenges to positive thinking and coping. Following verbal processing pts used music and art to create a window of life, with each pane representing a different part: past, present, future view of self, and anticipated future view from others. Attendees: 10         Notes:  Pt present across group interventions, positively social and collaborative with peers in group processing of healthy distraction in coping, maintaining focus on positive elements of daily life. During creative arts intervention, pt showed in the present that her Arelia Deter is covered and fighting to shine\", mentioning role of family support in maintaining positive perspective. Status After Intervention:  Improved    Participation Level:  Active Listener and Interactive    Participation Quality: Appropriate, Sharing and Supportive      Speech:  normal      Thought Process/Content: Logical  Linear      Affective Functioning: Congruent      Mood: depressed      Level of consciousness:  Alert and Oriented x4      Response to Learning: Able to verbalize current knowledge/experience, Able to verbalize/acknowledge new learning, Capable of insight and Progressing to goal      Endings: None Reported    Modes of Intervention: Support, Socialization, Exploration, Clarifying, Activity and Media      Discipline Responsible: Psychoeducational Specialist      Signature:  Sveta Candelaria MM, MT-BC

## 2021-02-21 NOTE — GROUP NOTE
Group Therapy Note    Date: 2/21/2021    Group Start Time: 1330  Group End Time: 1430  Group Topic: Psychoeducation    [address]    Group Therapy Note    Attendees: 10    Patients learned about values and goals; discussed how mental illness impacts values and goals. Patients were provided psychoeducation handouts on SMART goal setting and discussed setting SMART goals as a group. Patients discussed importance of setting both long term and short term goals during group. Therapist encouraged patients to use goal setting worksheet during group. Notes:  Pt was engaged in group discussion; pt appeared to relate and connect with peers during group. Pt offered supportive feedback to peer during group. Status After Intervention:  Improved    Participation Level:  Active Listener and Interactive    Participation Quality: Appropriate      Speech:  normal      Thought Process/Content: Logical  Linear      Affective Functioning: Constricted/Restricted      Mood: depressed      Level of consciousness:  Alert and Oriented x4      Response to Learning: Able to verbalize current knowledge/experience, Able to verbalize/acknowledge new learning and Able to retain information      Endings: None Reported    Modes of Intervention: Education, Support, Socialization, Exploration, Clarifying, Problem-solving and Activity      Discipline Responsible: /Counselor      Signature:  RICARDO Salazar-S, R-LIDIAT

## 2021-02-21 NOTE — BH NOTE
[facility]  Day 3 Interdisciplinary Treatment Plan NOTE    Review Date & Time: 02/21/21 0900    Patient was not in treatment team    Admission Type:   Admission Type: Involuntary    Reason for admission:  Reason for Admission: confusion, possible hallucinations/psychosis  Estimated Length of Stay Update:  1-3 days   Estimated Discharge Date Update: 1-3 days     PATIENT STRENGTHS:  Patient Strengths Strengths: Communication, Connection to output provider, Positive Support, Medication Compliance  Patient Strengths and Limitations:Limitations: Difficulty problem solving/relies on others to help solve problems, External locus of control  Addictive Behavior:Addictive Behavior  In the past 3 months, have you felt or has someone told you that you have a problem with:  : None  Do you have a history of Chemical Use?: No  Do you have a history of Alcohol Use?: No  Do you have a history of Street Drug Abuse?: No  Histroy of Prescripton Drug Abuse?: No  Medical Problems:  Past Medical History:   Diagnosis Date    Anxiety     Depression     Hypertension     PTSD (post-traumatic stress disorder)     RA (rheumatoid arthritis) ([facility] [address]        Risk:  Fall RiskTotal: 65  Jose Enrique Scale Jose Enrique Scale Score: 22  BVC Total: 0  Change in scores NO.  Changes to plan of Care  No    Status EXAM:   Status and Exam  Normal: No  Facial Expression: Flat, Worried  Affect: Congruent  Level of Consciousness: Alert  Mood:Normal: No  Mood: Depressed  Motor Activity:Normal: No  Motor Activity: Decreased  Interview Behavior: Cooperative  Preception: [place] to Person, [place] to Time, [place] to Place, [place] to Situation  Attention:Normal: Yes  Attention: (Has been attentive during groups and less preoccupied)  Thought Processes: Circumstantial  Thought Content:Normal: No  Thought Content: Preoccupations  Hallucinations: None  Delusions: No  Delusions: Persecution  Memory:Normal: No  Memory: Poor Recent  Insight and Judgment: No  Insight

## 2021-02-21 NOTE — GROUP NOTE
Group Therapy Note    Date: 2/20/2021    Group Start Time: 2015  Group End Time: 2030  Group Topic: Wrap-Up    [facility]        Group Therapy Note    Attendees: Goals and importance of goal setting discussed. Night time milieu activities discussed. Group was cut short due to code violet on the unit         Patient's Goal:  To socialize more    Notes:  Successful     Status After Intervention:  Improved    Participation Level:  Active Listener and Interactive    Participation Quality: Appropriate and Attentive      Speech:  normal      Thought Process/Content: Logical  Linear      Affective Functioning: Congruent      Mood: anxious      Level of consciousness:  Alert and Oriented x4      Response to Learning: Progressing to goal      Endings: None Reported    Modes of Intervention: Support      Discipline Responsible: [employer]      Signature:  Isabell Faustin

## 2021-02-21 NOTE — PLAN OF CARE
Problem: Altered Mood, Depressive Behavior:  Goal: Able to verbalize and/or display a decrease in depressive symptoms  Description: Able to verbalize and/or display a decrease in depressive symptoms  Outcome: Ongoing  Met with client to complete assessment. Client reports, \" Im trying to get better. I was up earlier and made my bed. \" client is also noted to be in group. Denies SI/HI. Client reports a decrease in anxiety. No additional complaints. Medication compliant.  Will continue to assess

## 2021-02-21 NOTE — GROUP NOTE
Group Therapy Note    Date: 2/21/2021    Group Start Time: 36  Group End Time: 1700  Group Topic: Healthy Living/Wellness    [address], RN        Group Therapy Note    Attendees: 7/21         Patient's Goal:  To demonstrate appropriate social interaction    Status After Intervention:  Unchanged    Participation Level: Interactive    Participation Quality: Appropriate      Speech:  normal      Thought Process/Content: Linear      Affective Functioning: Congruent      Mood: euthymic      Level of consciousness:  Alert      Response to Learning: Able to retain information      Endings: None Reported    Modes of Intervention: Socialization      Discipline Responsible: Registered Nurse      Signature:  Heywood Dubin, RN

## 2021-02-21 NOTE — PLAN OF CARE
Problem: Mood - Altered:  Goal: Mood stable  Description: Mood stable  Outcome: Ongoing   Corrie attended group this shift and denies current SI/HI, denies A/V/H. Patient reports \" I do not like to be around people but I did attend 3 groups today and I am trying to be out of my room more. \" Medication compliant and pleasant on approach.

## 2021-02-22 PROCEDURE — 97165 OT EVAL LOW COMPLEX 30 MIN: CPT

## 2021-02-22 PROCEDURE — 6370000000 HC RX 637 (ALT 250 FOR IP): Performed by: PHYSICIAN ASSISTANT

## 2021-02-22 PROCEDURE — 6370000000 HC RX 637 (ALT 250 FOR IP): Performed by: PSYCHIATRY & NEUROLOGY

## 2021-02-22 PROCEDURE — 6360000002 HC RX W HCPCS: Performed by: PSYCHIATRY & NEUROLOGY

## 2021-02-22 PROCEDURE — 97535 SELF CARE MNGMENT TRAINING: CPT

## 2021-02-22 PROCEDURE — 1240000000 HC EMOTIONAL WELLNESS R&B

## 2021-02-22 PROCEDURE — 99233 SBSQ HOSP IP/OBS HIGH 50: CPT | Performed by: PSYCHIATRY & NEUROLOGY

## 2021-02-22 RX ADMIN — LOSARTAN POTASSIUM 50 MG: 25 TABLET, FILM COATED ORAL at 21:10

## 2021-02-22 RX ADMIN — TACROLIMUS 3 MG: 1 CAPSULE ORAL at 08:58

## 2021-02-22 RX ADMIN — VILAZODONE HYDROCHLORIDE 80 MG: 40 TABLET ORAL at 08:59

## 2021-02-22 RX ADMIN — TACROLIMUS 3 MG: 1 CAPSULE ORAL at 21:13

## 2021-02-22 RX ADMIN — TRAZODONE HYDROCHLORIDE 50 MG: 50 TABLET ORAL at 21:10

## 2021-02-22 RX ADMIN — TOPIRAMATE 25 MG: 25 TABLET, FILM COATED ORAL at 21:10

## 2021-02-22 RX ADMIN — TOPIRAMATE 25 MG: 25 TABLET, FILM COATED ORAL at 08:59

## 2021-02-22 RX ADMIN — AMLODIPINE BESYLATE 5 MG: 5 TABLET ORAL at 08:59

## 2021-02-22 RX ADMIN — ARIPIPRAZOLE 10 MG: 10 TABLET ORAL at 08:59

## 2021-02-22 NOTE — PLAN OF CARE
Problem: Altered Mood, Psychotic Behavior:  Goal: Able to verbalize decrease in frequency and intensity of hallucinations  Description: Able to verbalize decrease in frequency and intensity of hallucinations  Outcome: Ongoing   Corrie has been out in the milieu, minimally social. Medication compliant. Reports feeling better and denies current SI/HI, denies A/V/H. Attended evening group.

## 2021-02-22 NOTE — BH NOTE
Writer spoke with pt's daughter Adeola Samuels who shared that she was available to help mother with any tasks related to moving insurance or getting connected to services. Sister shared that she is hopeful that her mother will remain in [address] [place] [address] Drive for a while to get stabilized since other sister stays at home and can help mother. She confirmed that there has never been an issue with psychosis and this was totally new. She did share that pt has a hx of depression for a long time and also had a \"tragic childhood\". She is concerned that [place] the airport and reported that pt is very good at masking what is happening. She requested that writer give her contact information to hospital financial aid to assist with any needs.      Helen Barbosa MSW, LSW

## 2021-02-22 NOTE — PROGRESS NOTES
Triglycerides 02/20/2021 119  0 - 150 mg/dL Final    HDL 02/20/2021 41  40 - 60 mg/dL Final    LDL Calculated 02/20/2021 100* <100 mg/dL Final    VLDL Cholesterol Calculated 02/20/2021 24  Not Established mg/dL Final    Hemoglobin A1C 02/20/2021 6.4  See comment % Final    Comment: Comment:  Diagnosis of Diabetes: > or = 6.5%  Increased risk of diabetes (Prediabetes): 5.7-6.4%  Glycemic Control: Nonpregnant Adults: <7.0%                    Pregnant: <6.0%        eAG 02/20/2021 137.0  mg/dL Final    TSH 02/20/2021 0.51  0.27 - 4.20 uIU/mL Final            Medications  Current Facility-Administered Medications: influenza quadrivalent split vaccine (FLUZONE;FLUARIX;FLULAVAL;AFLURIA) injection 0.5 mL, 0.5 mL, Intramuscular, Prior to discharge  acetaminophen (TYLENOL) tablet 650 mg, 650 mg, Oral, Q4H PRN  ibuprofen (ADVIL;MOTRIN) tablet 400 mg, 400 mg, Oral, Q6H PRN  magnesium hydroxide (MILK OF MAGNESIA) 400 MG/5ML suspension 30 mL, 30 mL, Oral, Daily PRN  aluminum & magnesium hydroxide-simethicone (MAALOX) 200-200-20 MG/5ML suspension 30 mL, 30 mL, Oral, Q6H PRN  sterile water injection 2.1 mL, 2.1 mL, Intramuscular, Q4H PRN  traZODone (DESYREL) tablet 50 mg, 50 mg, Oral, Nightly PRN  benztropine mesylate (COGENTIN) injection 2 mg, 2 mg, Intramuscular, BID PRN  hydrOXYzine (ATARAX) tablet 50 mg, 50 mg, Oral, TID PRN  amLODIPine (NORVASC) tablet 5 mg, 5 mg, Oral, Daily  losartan (COZAAR) tablet 50 mg, 50 mg, Oral, Nightly  ARIPiprazole (ABILIFY) tablet 10 mg, 10 mg, Oral, Daily  tacrolimus (PROGRAF) capsule 3 mg, 3 mg, Oral, BID  topiramate (TOPAMAX) tablet 25 mg, 25 mg, Oral, BID  vilazodone HCl (VIIBRYD) TABS 80 mg, 80 mg, Oral, Daily    ASSESSMENT AND PLAN    Principal Problem:    Acute psychosis ([address]  Active Problems:    Essential hypertension    Rheumatoid arthritis in remission (HCC)    Obesity (BMI 30.0-34. 9)  Resolved Problems:    * No resolved hospital problems. *       1. Patient s symptoms   are improving  2. Probable discharge is tomorrow  3. Discharge planning is complete  4. Suicidal ideation is none  5. Total time with patient was 40 minutes and more than 50 % of that time was spent counseling the patient on their symptoms, treatment and expected goals.

## 2021-02-22 NOTE — GROUP NOTE
Group Therapy Note    Date: 2/22/2021    Group Start Time: 1100  Group End Time: 1200  Group Topic: Recovery    [facility]    Bhavya Rowley [employer]        Group Therapy Note    Attendees: 12         Patient's Goal:  Patient will complete worksheet on Dependency and will discuss how over-reliance on others' approval effects mental health. Notes:  Patient attended group. Completed the worksheet and discussed in group. Patient verbalized an understanding of the concepts and gave examples from everyday life. Status After Intervention:  Improved    Participation Level:  Active Listener and Interactive    Participation Quality: Appropriate and Attentive    Speech:  normal    Thought Process/Content: Logical    Affective Functioning: Congruent    Mood: anxious, depressed     Level of consciousness:  Oriented x4    Response to Learning: Able to verbalize current knowledge/experience and Able to verbalize/acknowledge new learning    Endings: None Reported    Modes of Intervention: Education, Support, Socialization and Exploration    Discipline Responsible: /Counselor    Signature:  HUANG Ratliff [employer]

## 2021-02-22 NOTE — PROGRESS NOTES
Inpatient Occupational Therapy  Evaluation and Treatment    Unit:  [facility]  Date:  2/22/2021  Patient Name:    Charles Byers  Admitting diagnosis:  Acute psychosis [facility]) Rene Arm  Admit Date:  2/18/2021  Precautions/Restrictions/WB Status/ Lines/ Wounds/ Oxygen:   Up as tolerated  Treatment Time:  9:53-10:34  Treatment Number:  1    Patient Goals for Therapy:  \" Be able to feel as normal as possible and remember what happened. \"      Discharge Recommendations:   []Home Independently  [x] Home with assist [] Home OT  []SNF  []ARU    DME needs for discharge:   NA     AM-PAC Score:   24     Home Health S4 Level: [x] NA   [] Level 1- Standard  []  Level 2- Social  [] Level 3- Safety  []  Level 4- Sick    ACLS:  Completed 2/22/2021  Mode 5.4  Engaging Abilities and Following Safety Precautions When the Person Can Engage in Self-directed Learning     DESCRIPTION:    14% Cognitive Assistance: The person may live alone and work in a job with a wide margin of error. 14% standby cognitive assistance is needed to anticipate hazards and prevent industrial accidents. Individual preferences for improving the appearance of activities can be honored. 2% Physical Assistance is needed for fine motor actions. Preadmission Environment:    Pt. Lives   [] alone  [x]with daughter and her , grand-daughter. Home environment:   []Apartment   []one story  [x]two story condominium. Steps to enter first floor:    [x] No steps     []  Steps to enter   [] Railings    Steps to second floor  [x] Full Flight of 13  Bathroom: [x] Bath Tub Shower  []Walk in [employer]  [] Grab bars  Equipment owned: [] RW [] SW  []Rollator []W/C  []Shower Chair []BSC []Reacher  [employer] [] Other     Preadmission Status / PLOF:  History of falls   []Yes  []No  Pt. Able to drive   []Yes  []No   Pt Fully independent for ADLs/IADLs. [x]Yes  however pt. Reports assistance needed for fine motor activities if her RA is flared.   Pt. Required assistance from family for: []Bathing []Dressing []Cooking []Cleaning  []Laundry  []Other :   Pt. Fully independent for transfers and gait and walked with: [x]No Device  []Walker   []Cane   Sleep Hygiene:  6-8 hours sleep; fragmented secondary to nightmares. Income:  unemployed  Financial Management:  Daughter assists. Leisure Interests:  Walking, hiking, music, reading  Medication Management:  Self; pt reports no difficulty  Health Management:  Pt. Reports that she has a PCP, Psychiatrist and therapist.  Social Network:  2 daughters, sister  Stressors:  1. Trying to find a job. 2.  Depression. 3.  RA  Coping Skills:  Meditation, therapy, reading, music, walking    Pain  [x]Yes  []No  Ratin:10  Location:hands/knees, ankles  Pain Medicine Status: [] Denies need  [] Pain med requested  [] RN notified. Cognition    A&Ox person, date. Pt. Disoriented to place, decreased insight into situation  Patient cooperative. Follows []1 step and [x]2 step commands. Upper Extremity ROM:    WFL, pt able to perform all bed mobility, transfers, and gait without ROM limitation. Upper Extremity Strength:    WFL, pt able to perform all bed mobility, transfers, and gait without strength limitation. Upper Extremity Sensation:    No apparent deficits. Upper Extremity Proprioception:    No apparent deficits. Skin Integrity:  WFL     Coordination and Tone:  WFL    Balance  Static Sitting:  [x] Good [] Fair [] Poor  Dynamic Sitting:  [x] Good [] Fair [] Poor  Static Standing: [x] Good [] Fair [] Poor  Dynamic Standing: [x] Good [] Fair [] Poor    Bed mobility:   independent  Supine to sit:  Sit to supine:  Scooting to head of bed:  Scooting in sitting:  Rolling:  Bridging:    Transfers:  Independent  Sit to stand:  Stand to sit:  Bed/Chair to/from toilet:    Self Care:  independent  Toileting:  Grooming:  Dressing:    Exercise / Activities Initiated:   Pt. Educated on role of OT.   Pt. Participated in:  Eval, ACLS, ADL Retraining. Assessment of Deficits:   Pt demonstrated decreased activity tolerance, decreased safety awareness, decreased cognition, and decreased ADL/IADL status. Pt. Limited during evaluation by decreased cognition. At end of evaluation, pt. In room. Goal(s) : To be met in 3 Visits:  1). Pt. To complete ACLS.  (completed 2/22/2021)  2). Pt. To verbalize understanding of sleep hygiene education. To be met in 5 Visits:  1). Pt. To complete 1 SMART long term goal and 2 SMART short term goals with mod assist.  2). Pt. To verbalize 3 new coping skills. 3). Pt. To complete interest check list.    4). Pt. To verbalize understanding of 3 communication styles. 5). Pt. To complete wellness plan. 6). Pt. To complete a daily schedule of healthy activities/routines with mod assist.   7). Pt. To identify 2 memory strategies to take medications as prescribed. Rehabilitation Potential:  Good for goals listed above. Strengths for achieving goals include:  PLOF  Barriers to achieving goals include:  Decreased Cognition     Plan: To be seen 2-5x/week while in acute care setting for therapeutic exercises/act, ADL retraining, NMR and patient/caregiver ed/instruction.      Timed Code Treatment Minutes:    31 minutes    Total Treatment Time:   41   minutes    Signature and License Number    Miguelangel Butcher OTR/L  #[id number]        If patient discharges from this facility prior to next visit, this note will serve as the Discharge Summary

## 2021-02-22 NOTE — GROUP NOTE
Group Therapy Note    Date: 2/21/2021    Group Start Time: 2030  Group End Time: 2110  Group Topic: Wrap-Up    [facility]        Group Therapy Note    Attendees: Goals and importance of goal setting discussed. Night time milieu activities discussed. Patient's Goal:  Attend 2-3 groups    Notes:  Successful     Status After Intervention:  Improved    Participation Level:  Active Listener and Interactive    Participation Quality: Appropriate, Attentive and Sharing      Speech:  normal      Thought Process/Content: Logical  Linear      Affective Functioning: Congruent      Mood: euthymic      Level of consciousness:  Alert and Oriented x4      Response to Learning: Progressing to goal      Endings: None Reported    Modes of Intervention: Support      Discipline Responsible: Behavorial Health Tech      Signature:  Jacy Baugh

## 2021-02-22 NOTE — GROUP NOTE
Group Therapy Note    Date: 2/22/2021    Group Start Time: 1000  Group End Time: 1100  Group Topic: Cognitive Skills    [address]        Group Therapy Note    Attendees: 6         Patient's Goal: to actively participate in group activity and discussion regarding how to take care of ourselves by eating, sleeping, having a good support system, exercise, leisure activities take medication in order to help prevent relapse with our mental health. Notes:  Pt initially attended group but then was called out to meet with the psychiatrist. Pt did not return to group.         Signature:  DELANEY Delacruz

## 2021-02-23 VITALS
RESPIRATION RATE: 16 BRPM | BODY MASS INDEX: 34.15 KG/M2 | HEART RATE: 80 BPM | TEMPERATURE: 97.7 F | OXYGEN SATURATION: 97 % | DIASTOLIC BLOOD PRESSURE: 76 MMHG | HEIGHT: 64 IN | SYSTOLIC BLOOD PRESSURE: 121 MMHG | WEIGHT: 200 LBS

## 2021-02-23 PROCEDURE — 99239 HOSP IP/OBS DSCHRG MGMT >30: CPT | Performed by: PSYCHIATRY & NEUROLOGY

## 2021-02-23 PROCEDURE — 6370000000 HC RX 637 (ALT 250 FOR IP): Performed by: PSYCHIATRY & NEUROLOGY

## 2021-02-23 PROCEDURE — 6360000002 HC RX W HCPCS: Performed by: PSYCHIATRY & NEUROLOGY

## 2021-02-23 PROCEDURE — 5130000000 HC BRIDGE APPOINTMENT

## 2021-02-23 PROCEDURE — 6370000000 HC RX 637 (ALT 250 FOR IP): Performed by: PHYSICIAN ASSISTANT

## 2021-02-23 RX ORDER — IRBESARTAN 150 MG/1
150 TABLET ORAL NIGHTLY
Qty: 30 TABLET | Refills: 0 | Status: SHIPPED | OUTPATIENT
Start: 2021-02-23 | End: 2021-05-27 | Stop reason: SDUPTHER

## 2021-02-23 RX ORDER — TOPIRAMATE 25 MG/1
25 TABLET ORAL 2 TIMES DAILY
Qty: 60 TABLET | Refills: 0 | Status: SHIPPED | OUTPATIENT
Start: 2021-02-23

## 2021-02-23 RX ORDER — LOSARTAN POTASSIUM 50 MG/1
50 TABLET ORAL NIGHTLY
Qty: 30 TABLET | Refills: 0 | Status: SHIPPED | OUTPATIENT
Start: 2021-02-23 | End: 2021-04-26

## 2021-02-23 RX ORDER — IRBESARTAN 150 MG/1
150 TABLET ORAL NIGHTLY
Qty: 30 TABLET | Refills: 0 | Status: SHIPPED | OUTPATIENT
Start: 2021-02-23 | End: 2021-02-23 | Stop reason: SDUPTHER

## 2021-02-23 RX ORDER — VILAZODONE HYDROCHLORIDE 40 MG/1
80 TABLET ORAL DAILY
Qty: 60 TABLET | Refills: 0 | Status: SHIPPED
Start: 2021-02-23 | End: 2021-04-26 | Stop reason: CLARIF

## 2021-02-23 RX ORDER — AMLODIPINE BESYLATE 5 MG/1
5 TABLET ORAL DAILY
Qty: 30 TABLET | Refills: 0 | Status: SHIPPED | OUTPATIENT
Start: 2021-02-23 | End: 2021-05-22 | Stop reason: SDUPTHER

## 2021-02-23 RX ORDER — ARIPIPRAZOLE 10 MG/1
10 TABLET ORAL DAILY
Qty: 30 TABLET | Refills: 0 | Status: SHIPPED | OUTPATIENT
Start: 2021-02-24 | End: 2022-09-26 | Stop reason: CLARIF

## 2021-02-23 RX ORDER — TACROLIMUS 1 MG/1
3 CAPSULE ORAL 2 TIMES DAILY
Qty: 180 CAPSULE | Refills: 0 | Status: SHIPPED | OUTPATIENT
Start: 2021-02-23 | End: 2021-06-30 | Stop reason: SDUPTHER

## 2021-02-23 RX ADMIN — AMLODIPINE BESYLATE 5 MG: 5 TABLET ORAL at 08:41

## 2021-02-23 RX ADMIN — ARIPIPRAZOLE 10 MG: 10 TABLET ORAL at 08:41

## 2021-02-23 RX ADMIN — VILAZODONE HYDROCHLORIDE 80 MG: 40 TABLET ORAL at 08:41

## 2021-02-23 RX ADMIN — TOPIRAMATE 25 MG: 25 TABLET, FILM COATED ORAL at 08:40

## 2021-02-23 RX ADMIN — TACROLIMUS 3 MG: 1 CAPSULE ORAL at 08:43

## 2021-02-23 NOTE — GROUP NOTE
Group Therapy Note    Date: 2/23/2021    Group Start Time: 1115  Group End Time: 6629  Group Topic: [address]        Group Therapy Note    Attendees: 8    Patient's Goal: to actively participate in group discussion regarding emotion identification. Pts were encouraged to practice emotion identification by listening to music utilized, identifying their emotional reaction, and communicating those emotions with peers to increase emotion identification, improve communication skills, and improve overall mood. Notes:  Corrie actively engaged in group while in attendance. Pt identified body sensations experienced with emotions and participated in discussions. Corrie left group early for discharge planning. Status After Intervention:  Improved    Participation Level:  Active Listener and Interactive    Participation Quality: Appropriate and Attentive      Speech:  normal      Thought Process/Content: Logical  Linear      Affective Functioning: Congruent      Mood: anxious      Level of consciousness:  Alert, Oriented x4 and Attentive      Response to Learning: Able to verbalize current knowledge/experience, Able to verbalize/acknowledge new learning and Progressing to goal      Endings: None Reported    Modes of Intervention: Education, Support, Socialization, Clarifying, Activity and Media      Discipline Responsible: Psychoeducational Specialist      Signature:  PAIGE Dominguez

## 2021-02-23 NOTE — BH NOTE
[facility]  Discharge Note    Pt discharged with followings belongings:   Dentures: Partials(both sides of lower)  Vision - Corrective Lenses: Glasses  Hearing Aid: None  Jewelry: None  Body Piercings Removed: N/A  Clothing: Footwear, Pants, Shirt, Socks, Undergarments (Comment)  Were All Patient Medications Collected?: Not Applicable  Other Valuables: Other (Comment)(toiletries)   Valuables sent home with patient. Valuables retrieved from safe and returned to patient. Patient education on aftercare instructions: yes. Information faxed to N/A by this writer. Patient verbalize understanding of AVS:  yes. Status EXAM upon discharge:  Status and Exam  Normal: Yes  Facial Expression: Brightened  Affect: Congruent, Stable  Level of Consciousness: Alert  Mood:Normal: Yes  Mood: Anxious  Motor Activity:Normal: Yes  Motor Activity: Other(See Comments)(wnl)  Interview Behavior: Cooperative  Preception: [place] to Person, [place] to Time, [place] to Place, [place] to Situation  Attention:Normal: Yes  Attention: Others(See comment)(wnl)  Thought Processes: Other(See comment)(wnl)  Thought Content:Normal: Yes  Thought Content: Preoccupations  Hallucinations: None  Delusions: No  Delusions: Persecution  Memory:Normal: Yes  Memory: Poor Recent  Insight and Judgment: Yes  Insight and Judgment: Poor Judgment  Present Suicidal Ideation: No  Present Homicidal Ideation: No      Metabolic Screening:    Lab Results   Component Value Date    LABA1C 6.4 02/20/2021       Lab Results   Component Value Date    CHOL 165 02/20/2021     Lab Results   Component Value Date    TRIG 119 02/20/2021     Lab Results   Component Value Date    HDL 41 02/20/2021     No components found for: [facility]  Lab Results   Component Value Date    LABVLDL 24 02/20/2021       Shante Jett RN    Bridge Appointment completed: Reviewed Discharge Instructions with patient.     Patient verbalizes understanding and agreement with the discharge plan using the teachback method. Referral for Outpatient Tobacco Cessation Counseling, upon discharge (aleisha X if applicable and completed):    ( )  Hospital staff assisted patient to call Quit Line or faxed referral                                   during hospitalization                  (X)  Recognizing danger situations (included triggers and roadblocks), if not completed on admission                    (X)  Coping skills (new ways to manage stress, exercise, relaxation techniques, changing routine, distraction), if not completed on admission    (X)  Basic information about quitting (benefits of quitting, techniques in how to quit, available resources, if not completed on admission  ( ) Referral for counseling faxed to [facility]   ( ) Patient refused referral  ( ) Patient refused counseling  ( ) Patient refused smoking cessation medication upon discharge    Vaccinations (aleisha X if applicable and completed):   ( X) Patient states already received influenza vaccine elsewhere  ( ) Patient received influenza vaccine during this hospitalization  ( ) Patient refused influenza vaccine at this time

## 2021-02-23 NOTE — PLAN OF CARE
Problem: Altered Mood, Psychotic Behavior:  Goal: Able to verbalize reality based thinking  Description: Able to verbalize reality based thinking  Outcome: Ongoing  Bright. Attending groups. Out all day & social. Speaking with family throughout the day.

## 2021-02-23 NOTE — PLAN OF CARE
Nursing shift report 1900 to present- Patient has been visible on the unit, quiet but social with some peers. Her affect is brighter and she denies SI, HI and AVh although she did report some anxiety about possible discharge tomorrow. She stated that she is ready for discharge just anxious about it. Patient attended group , ate snack and was medication compliant. She did get trazodone 50 mg at 2110 for sleep as she was worried she would not sleep due to feeling anxious about discharge.

## 2021-02-23 NOTE — GROUP NOTE
Group Therapy Note    Date: 2/23/2021    Group Start Time: 1000  Group End Time: 1100  [facility]        [facility]    Attendees: 11       Patient's Goal:  Patient will complete worksheet on humility. Will identify how humility contributes to overall happiness. Will explore ways to cultivate humility. Notes:  Patient attended group. Completed the worksheet and discussed. Talked about how humility contributes to happiness. Patient identified activities to do that will cultivate humility in her life. Status After Intervention:  Improved    Participation Level:  Active Listener and Interactive    Participation Quality: Appropriate, Attentive, Sharing and Supportive      Speech:  normal      Thought Process/Content: Logical      Affective Functioning: Congruent      Mood: anxious and depressed      Level of consciousness:  Alert and Oriented x4      Response to Learning: Able to verbalize current knowledge/experience      Endings: None Reported    Modes of Intervention: Education, Support, Socialization and Exploration      Discipline Responsible: /Counselor      Signature:  Bhavya Rowley [facility]

## 2021-02-23 NOTE — SUICIDE SAFETY PLAN
SAFETY PLAN    A suicide Safety Plan is a document that supports someone when they are having thoughts of suicide. Warning Signs that indicate a suicidal crisis may be developing: What (situations, thoughts, feelings, body sensations, behaviors, etc.) do you experience that lets you know you are beginning to think about suicide? 1. I think they do not like me  2. They might be thinking im ugly    Internal Coping Strategies:  What things can I do (relaxation techniques, hobbies, physical activities, etc.) to take my mind off my problems without contacting another person? 1. write  2. Listen to music  3. walk    People and social settings that provide distraction: Who can I call or where can I go to distract me? 1. Name: Argenis Upton  Phone: [phone]  2. Name: Ashanti Coronado  Phone: [phone]   3. Place: [place], [place]            4. Place: [address] whom I can ask for help: Who can I call when I need help - for example, friends, family, clergy, someone else? 1. Name: Argenis Upton  Phone: [phone]  2. Name: Ashanti Coronado  Phone: [phone]   3. Name: Sabrina Hardwick or [address] [place] [address] I can contact during a crisis: Who can I call for help - for example, my doctor, my psychiatrist, my psychologist, a mental health provider, a suicide hotline? 1. Clinician Name: [address] [place] [address]   Address: [facility] [address] [place], [facility]      Phone  #: [phone]    2. Suicide Prevention Lifeline: 6-381-583-TALK (4340)    Making the environment safe: How can I make my environment (house/apartment/living space) safer? For example, can I remove guns, medications, and other items? 1. Be alone while feeling something might be brewing on my mind    Something that is important to me and worth living for is: my family, my life, god. My every cell in my body. I am worth living for.
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
## 2021-03-01 ENCOUNTER — HOSPITAL ENCOUNTER (OUTPATIENT)
Dept: PSYCHIATRY | Age: 51
Setting detail: THERAPIES SERIES
Discharge: HOME | End: 2021-03-01
Payer: MEDICAID

## 2021-03-01 PROCEDURE — 90791 PSYCH DIAGNOSTIC EVALUATION: CPT

## 2021-03-01 RX ORDER — SARILUMAB 200 MG/1.14ML
1 INJECTION, SOLUTION SUBCUTANEOUS
COMMUNITY
Start: 2021-01-04 | End: 2022-09-26

## 2021-03-01 ASSESSMENT — SLEEP AND FATIGUE QUESTIONNAIRES
SLEEP PATTERN: DIFFICULTY FALLING ASLEEP
DIFFICULTY FALLING ASLEEP: YES
AVERAGE NUMBER OF SLEEP HOURS: 5

## 2021-03-01 ASSESSMENT — ANXIETY QUESTIONNAIRES
1. FEELING NERVOUS, ANXIOUS, OR ON EDGE: 1-SEVERAL DAYS
2. NOT BEING ABLE TO STOP OR CONTROL WORRYING: 0-NOT AT ALL
4. TROUBLE RELAXING: 0-NOT AT ALL
3. WORRYING TOO MUCH ABOUT DIFFERENT THINGS: 1-SEVERAL DAYS
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
## 2021-03-05 ENCOUNTER — HOSPITAL ENCOUNTER (OUTPATIENT)
Dept: PSYCHIATRY | Age: 51
Setting detail: THERAPIES SERIES
Discharge: HOME | End: 2021-03-05
Payer: MEDICAID

## 2021-03-05 VITALS — TEMPERATURE: 96.2 F

## 2021-03-05 DIAGNOSIS — F23 ACUTE PSYCHOSIS (HCC): ICD-10-CM

## 2021-03-05 PROCEDURE — 90853 GROUP PSYCHOTHERAPY: CPT

## 2021-03-05 PROCEDURE — 90853 GROUP PSYCHOTHERAPY: CPT | Performed by: COUNSELOR

## 2021-03-05 NOTE — GROUP NOTE
Group Therapy Note    Date: 3/5/2021    Group Start Time: 11:15 AM  Group End Time: 12:15 PM  Group Topic: Psychoeducation    [facility]    Atul Padronx        Group Therapy Note    Mode of Intervention: Creative Writing & Art    Patients provided with sheet displaying 48 most well-known song lyrics, instructed to create blackout poetry, selecting words from page to create poetry and \"blacking out\" unused words. MT-BC provided live music stimuli across interventions. Group facilitator led discussion of writing in self-expression and reflection through song lyrics. Attendees: 4         Notes:  Pt engaged across session, shared with peers with prompting. Reported feeling \"embarrased\" in disclosure, received encouragement and support from peers, validated by facilitator. Status After Intervention:  Improved    Participation Level:  Active Listener and Interactive    Participation Quality: Appropriate, Sharing and Supportive      Speech:  normal      Thought Process/Content: Logical      Affective Functioning: Congruent      Mood: Positive and relaxed      Level of consciousness:  Alert and Oriented x4      Response to Learning: Able to verbalize/acknowledge new learning, Capable of insight and Progressing to goal      Endings: None Reported    Modes of Intervention: Socialization, Exploration, Activity and Media      Discipline Responsible: Psychoeducational Specialist      Signature:  Karishma Gore MM, MT-BC

## 2021-03-05 NOTE — GROUP NOTE
Group Therapy Note    Date: 3/5/2021    Group Start Time:  8:30 AM  Group End Time:  9:45 AM  Group Topic: Psychotherapy    [facility]    Aleida Brandon, [facility]        Group Therapy Note    Attendees: 4         Patient's Goal:  Pt's goal was to be in the moment. Notes:  Pt was engaged in group. Pt shared how she came to visit her daughter from [place] and her anxiety and depression were sever and she needed to be hospitalized. Pt met goal.     Status After Intervention:  Unchanged    Participation Level:  Active Listener and Interactive    Participation Quality: Appropriate, Attentive and Sharing      Speech:  normal      Thought Process/Content: Logical  Linear      Affective Functioning: Congruent      Mood: anxious and depressed      Level of consciousness:  Alert, Oriented x4 and Attentive      Response to Learning: Able to verbalize current knowledge/experience, Able to verbalize/acknowledge new learning, Able to retain information, Capable of insight, Able to change behavior and Progressing to goal      Endings: None Reported    Modes of Intervention: Education, Support, Socialization, Exploration, Clarifying, Problem-solving, Activity, Movement, Confrontation, Limit-setting and Reality-testing      Discipline Responsible: /Counselor      Signature:  Aleida Brandon, [facility]

## 2021-03-08 ENCOUNTER — HOSPITAL ENCOUNTER (OUTPATIENT)
Dept: PSYCHIATRY | Age: 51
Setting detail: THERAPIES SERIES
Discharge: HOME | End: 2021-03-08
Payer: MEDICAID

## 2021-03-08 VITALS — TEMPERATURE: 96.9 F

## 2021-03-08 DIAGNOSIS — F23 ACUTE PSYCHOSIS (HCC): ICD-10-CM

## 2021-03-08 PROCEDURE — 90853 GROUP PSYCHOTHERAPY: CPT | Performed by: COUNSELOR

## 2021-03-08 PROCEDURE — 90853 GROUP PSYCHOTHERAPY: CPT

## 2021-03-08 PROCEDURE — H2020 THER BEHAV SVC, PER DIEM: HCPCS

## 2021-03-08 NOTE — GROUP NOTE
Group Therapy Note    Date: 3/8/2021    Group Start Time: 11:15 AM  Group End Time: 12:15 PM  Group Topic: Recovery    [facility]    Saul Blum [employer]        Group Therapy Note    Attendees: 4       Patient's Goal:  Patient will complete worksheet on People Pleasing. Will discuss how people pleasing behavior negatively impacts mental health. Notes:  Patient attend group. Completed the worksheet and discussed. Appeared to relate to the topic and gave examples from the patients life. Status After Intervention:  Improved    Participation Level:  Active Listener and Interactive    Participation Quality: Appropriate, Attentive, Sharing and Supportive    Speech:  normal    Thought Process/Content: Logical  Linear    Affective Functioning: Congruent    Mood: anxious and depressed    Level of consciousness:  Oriented x4    Response to Learning: Able to verbalize current knowledge/experience and Capable of insight    Endings: None Reported    Modes of Intervention: Education, Support, Socialization and Exploration    Discipline Responsible: /Counselor      Signature:  Saul Blum [facility] [employer]

## 2021-03-08 NOTE — GROUP NOTE
Group Therapy Note    Date: 3/8/2021    Group Start Time: 10:00 AM  Group End Time: 11:00 AM  Group Topic: Psychoeducation    [facility]    Williamdelonrenetta Padronx        Group Therapy Note    Topic: Anxiety & Panic Education     Group members defined anxiety and panic, described symptoms associated with each, and processed strategies for symptoms management, coping and accepting feelings of panic and anxiety. Attendees: 4         Patient's Goal: Patients will participate in group processing of anxiety and panic, reflect on personally experienced symptoms and set goals for behavioral management and positive coping.     Notes:  Pt present across session, attentive to discussions with minimal verbal engagement. Reflective while discussing relationship with daughters, disassociation and externalizing symptoms of anxiety.     Status After Intervention:  Improved    Participation Level: Interactive    Participation Quality: Appropriate, Attentive and Sharing      Speech:  normal      Thought Process/Content: Logical      Affective Functioning: Congruent      Mood: depressed      Level of consciousness:  Alert and Oriented x4      Response to Learning: Capable of insight and Progressing to goal      Endings: None Reported    Modes of Intervention: Education, Support, Socialization, Exploration, Clarifying and Problem-solving      Discipline Responsible: Psychoeducational Specialist      Signature:  Richardson Abel, MM, MT-BC

## 2021-03-08 NOTE — GROUP NOTE
Group Therapy Note    Date: 3/8/2021    Group Start Time:  8:30 AM  Group End Time:  9:45 AM  Group Topic: Psychotherapy    [facility]    Nicole Loving, ATR        Group Therapy Note    Attendees: 4         Patient's Goal:  Patient was invited to participate in Psychotherapy group and to set a goal at the beginning of session to practice in group a new way of being in relationships. Notes:  Corrie shared her goal was to [place] about why I had what I don't even know to call it, I guess my breakdown\" and \"find ways to cope\" and recognize \"when I need help. \" Karl Amado shared that it was scary for her to not \"know why it happened,\" identified with group warning signs for her anxiety and depression, and processed the guilt she felt for accepting help from her adult children. Status After Intervention:  Improved    Participation Level:  Active Listener and Interactive    Participation Quality: Appropriate, Attentive and Sharing      Speech:  normal      Thought Process/Content: Logical      Affective Functioning: Blunted      Mood: anxious and depressed      Level of consciousness:  Alert, Oriented x4 and Attentive      Response to Learning: Able to verbalize current knowledge/experience, Able to verbalize/acknowledge new learning, Able to retain information, Capable of insight and Able to change behavior      Endings: None Reported    Modes of Intervention: Education, Support, Socialization, Exploration, Clarifying, Problem-solving and Activity      Discipline Responsible: Psychoeducational Specialist      Signature:  Nilo Suarez [facility]aneKatharina

## 2021-03-10 ENCOUNTER — HOSPITAL ENCOUNTER (OUTPATIENT)
Dept: PSYCHIATRY | Age: 51
Setting detail: THERAPIES SERIES
Discharge: HOME | End: 2021-03-10
Payer: MEDICAID

## 2021-03-10 VITALS — HEART RATE: 84 BPM | TEMPERATURE: 97.1 F | DIASTOLIC BLOOD PRESSURE: 88 MMHG | SYSTOLIC BLOOD PRESSURE: 162 MMHG

## 2021-03-10 DIAGNOSIS — F23 ACUTE PSYCHOSIS (HCC): ICD-10-CM

## 2021-03-10 DIAGNOSIS — F44.9: ICD-10-CM

## 2021-03-10 PROCEDURE — 90792 PSYCH DIAG EVAL W/MED SRVCS: CPT | Performed by: PSYCHIATRY & NEUROLOGY

## 2021-03-10 PROCEDURE — H2020 THER BEHAV SVC, PER DIEM: HCPCS | Performed by: COUNSELOR

## 2021-03-10 NOTE — GROUP NOTE
Group Therapy Note    Date: 3/10/2021    Group Start Time:  8:30 AM  Group End Time:  9:45 AM  Group Topic: Psychotherapy    [facility]    Aleida Brandon, [facility]        Group Therapy Note    Attendees: 4         Patient's Goal:  Pt's goal was to open up and share what she is feeling. Notes:  Pt was engaged in group. Pt shared how she does not remember her trip from [place] to [place]. Pt met her goal.    Status After Intervention:  Improved    Participation Level:  Active Listener and Interactive    Participation Quality: Appropriate, Attentive and Sharing      Speech:  hesitant      Thought Process/Content: Logical  Linear      Affective Functioning: Congruent      Mood: anxious and depressed      Level of consciousness:  Alert, Oriented x4 and Attentive      Response to Learning: Able to verbalize current knowledge/experience, Able to verbalize/acknowledge new learning and Progressing to goal      Endings: None Reported    Modes of Intervention: Education, Support, Socialization, Exploration, Clarifying, Problem-solving, Activity, Movement, Confrontation, Limit-setting and Reality-testing      Discipline Responsible: /Counselor      Signature:  Aleida Brandon, [facility]

## 2021-03-10 NOTE — GROUP NOTE
Group Therapy Note    Date: 3/10/2021    Group Start Time: 10:00 AM  Group End Time: 11:00 AM  Group Topic: Recovery    [facility]    Cj Jefferson, [facility]        Group Therapy Note    Attendees: 4         Patient's Goal:  Patient will complete worksheet Don't Take Things Personally. Will explore ways to detach self from other's emotions by not making it about self. Notes:  Patient engaged well in group. Completed the worksheet and discussed. Talked about ways to detach from others negative emotions. Was able to reframe others thoughts, feelings, and behaviors as a reflection of themselves. Status After Intervention:  Improved    Participation Level:  Active Listener and Interactive    Participation Quality: Appropriate, Attentive, Sharing and Supportive      Speech:  normal      Thought Process/Content: Logical      Affective Functioning: Congruent      Mood: anxious and depressed      Level of consciousness:  Alert and Oriented x4      Response to Learning: Able to verbalize current knowledge/experience      Endings: None Reported    Modes of Intervention: Education, Support, Socialization and Exploration      Discipline Responsible: /Counselor      Signature:  Cj Jefferson, [facility]
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
## 2021-03-10 NOTE — GROUP NOTE
Group Therapy Note    Date: 3/10/2021    Group Start Time: 11:15 AM  Group End Time: 12:20 PM  Group Topic: Art Therapy     [facility] XIMENA Loving, A[facility]        Group Therapy Note    Attendees: 4         Patient's Goal:  Patients were invited to participate in an Art Therapy / Psycho-Education group on goal setting and future orientation. Through art making and/or writing, patients were asked to depict where they were, where they are now, and where they would like to be in the future, to consider what obstacles they have overcome and might face, and how they could cope with said obstacles. At the end of session, patients were invited to share and process their artwork with the group. Notes:  Kaity Ryan was active in group conversation, participated in art making, and processed work at the end, using metaphoric language and symbolism to share how she felt like \"I was in a good place, flying to go see my daughter, and then my world flipped upside down. It felt like an janina of darkness flew over me. Now I feel like the light of the luna is shining with hope. \"    Status After Intervention:  Improved    Participation Level:  Active Listener and Interactive    Participation Quality: Appropriate, Attentive and Sharing      Speech:  normal      Thought Process/Content: Logical      Affective Functioning: Congruent      Mood: anxious      Level of consciousness:  Alert, Oriented x4 and Attentive      Response to Learning: Able to verbalize current knowledge/experience, Able to verbalize/acknowledge new learning, Able to retain information, Capable of insight and Able to change behavior      Endings: None Reported    Modes of Intervention: Education, Support, Socialization, Exploration, Clarifying, Problem-solving, Activity and Media      Discipline Responsible: Psychoeducational Specialist      Signature:  Macey Mathias

## 2021-03-12 ENCOUNTER — HOSPITAL ENCOUNTER (OUTPATIENT)
Dept: PSYCHIATRY | Age: 51
Setting detail: THERAPIES SERIES
Discharge: HOME | End: 2021-03-12
Payer: MEDICAID

## 2021-03-12 VITALS — TEMPERATURE: 97.1 F

## 2021-03-12 DIAGNOSIS — F44.9: ICD-10-CM

## 2021-03-12 PROCEDURE — 90853 GROUP PSYCHOTHERAPY: CPT

## 2021-03-12 PROCEDURE — H2020 THER BEHAV SVC, PER DIEM: HCPCS | Performed by: COUNSELOR

## 2021-03-12 NOTE — GROUP NOTE
Group Therapy Note    Date: 3/12/2021    Group Start Time: 10:00 AM  Group End Time: 11:00 AM  Group Topic: Relapse Prevention    [facility]    [address] [place] [address]        Group Therapy Note    Attendees: 5         Patient's Goal:  To learn and discuss healthy ways to take care of ourselves such as eating, sleeping, medication, support system, exercise, leisure activities in order to help prevent relapse. Pt's asked to apply to themselves and want they need to work on. Notes:  Pt actively participated in the group discussion and was able to apply to herself. Pt reported that she needed to make sure she used her support system and using before she enda up being in a crisis. Status After Intervention:  Improved    Participation Level:  Active Listener and Interactive    Participation Quality: Appropriate, Attentive, Sharing and Supportive      Speech:  normal      Thought Process/Content: Logical      Affective Functioning: Congruent      Mood: anxious      Level of consciousness:  Alert, Oriented x4 and Attentive      Response to Learning: Able to verbalize current knowledge/experience, Able to verbalize/acknowledge new learning and Progressing to goal      Endings: None Reported    Modes of Intervention: Education, Support, Socialization, Exploration and Clarifying      Discipline Responsible: /Counselor      Signature:  [address]

## 2021-03-12 NOTE — GROUP NOTE
Group Therapy Note    Date: 3/12/2021    Group Start Time:  8:30 AM  Group End Time:  9:45 AM  Group Topic: Psychotherapy    [facility] BHTARIK Brooks        Group Therapy Note    Attendees: 5         Patient's Goal:  Pt set goal to continue exploring sadness vs depression. Notes:  During group pt provided feedback to peers, expressed validation to group members while discussing passive tendencies and utilizing boat theory as taught by additional IOP staff. Expressed \"relief\" after discussion with Dr Benjamin Moon r/t sleep and it's role in mental health and behavioral awareness. Discussed her age's role on preferences for healthy distraction in coping. Status After Intervention:  Improved    Participation Level:  Active Listener and Interactive    Participation Quality: Appropriate, Sharing and Supportive      Speech:  Normal      Thought Process/Content: Logical      Affective Functioning: Congruent      Mood: Positive and relaxed      Level of consciousness:  Alert and Oriented x4      Response to Learning: Able to verbalize/acknowledge new learning, Capable of insight and Progressing to goal      Endings: None Reported    Modes of Intervention: Support, Socialization, Exploration, Clarifying and Problem-solving      Discipline Responsible: Psychoeducational Specialist      Signature:  Lily Ghosh, MM, MT-BC

## 2021-03-12 NOTE — GROUP NOTE
Group Therapy Note    Date: 3/12/2021    Group Start Time: 11:15 AM  Group End Time: 12:15 PM  Group Topic: Cognitive Skills    [facility]    Aleida Brandon, [facility]        Group Therapy Note    Attendees: 5     Patient's Goal:  Pt's goal was to learn ways to manage anger and what emotions are under Pt's anger. Pt was to draw an anger volcano which represents what emotions are under their anger. Notes:  Pt participated in group discussion and group activity. Pt met goal.       Status After Intervention:  Improved    Participation Level:  Active Listener and Interactive    Participation Quality: Appropriate, Attentive and Sharing      Speech:  normal      Thought Process/Content: Logical  Linear      Affective Functioning: Congruent      Mood: euthymic      Level of consciousness:  Alert, Oriented x4 and Attentive      Response to Learning: Able to verbalize current knowledge/experience and Progressing to goal      Endings: None Reported    Modes of Intervention: Education, Support, Socialization, Exploration, Clarifying, Problem-solving, Activity, Movement, Confrontation, Limit-setting and Reality-testing      Discipline Responsible: /Counselor      Signature:  Gary Chao 54

## 2021-03-15 ENCOUNTER — HOSPITAL ENCOUNTER (OUTPATIENT)
Dept: PSYCHIATRY | Age: 51
Setting detail: THERAPIES SERIES
Discharge: HOME | End: 2021-03-15
Payer: MEDICAID

## 2021-03-15 VITALS — TEMPERATURE: 97.7 F

## 2021-03-15 DIAGNOSIS — F44.9: ICD-10-CM

## 2021-03-15 DIAGNOSIS — F23 ACUTE PSYCHOSIS (HCC): ICD-10-CM

## 2021-03-15 PROCEDURE — H2020 THER BEHAV SVC, PER DIEM: HCPCS

## 2021-03-15 PROCEDURE — 90853 GROUP PSYCHOTHERAPY: CPT

## 2021-03-15 PROCEDURE — 90853 GROUP PSYCHOTHERAPY: CPT | Performed by: COUNSELOR

## 2021-03-15 NOTE — GROUP NOTE
Group Therapy Note    Date: 3/15/2021    Group Start Time: 11:15 AM  Group End Time: 12:15 PM  Group Topic: Recovery    [facility]    [facility]        Group Therapy Note    Attendees: 5       Patient's Goal:  Patient will complete worksheet on People Pleasing. Will discuss how people pleasing behavior negatively impacts mental health. Notes:  Patient attend group. Completed the worksheet and discussed. Appeared to relate to the topic and gave examples from the patients life. Status After Intervention:  Improved    Participation Level:  Active Listener and Interactive    Participation Quality: Appropriate, Attentive, Sharing and Supportive    Speech:  normal    Thought Process/Content: Logical  Linear    Affective Functioning: Congruent    Mood: anxious and depressed    Level of consciousness:  Oriented x4    Response to Learning: Able to verbalize current knowledge/experience and Capable of insight    Endings: None Reported    Modes of Intervention: Education, Support, Socialization and Exploration    Discipline Responsible: /Counselor      Signature:  [facility]

## 2021-03-15 NOTE — GROUP NOTE
Group Therapy Note    Date: 3/15/2021    Group Start Time:  8:30 AM  Group End Time:  9:45 AM  Group Topic: Psychotherapy    [facility] WILFREDO Brooks        Group Therapy Note    Attendees: 4         Patient's Goal:  Pt set goal to participate in group as much as possible. Notes:  Pt explored masking anxiety through projected sadness \"until next episode\", discussing isolation and individual leisure time as healthy distraction while acknowledging her struggle to accept help from daughters. Corrie was validated by peers for making the statement \"I am responsible for myself\", reporting an increased sense of power and feeling of being \"in control\" when alone -- group members processed this as natural development through aging. Status After Intervention:  Improved    Participation Level:  Active Listener and Interactive    Participation Quality: Appropriate, Sharing and Supportive      Speech:  normal      Thought Process/Content: Logical      Affective Functioning: Congruent      Mood: depressed      Level of consciousness:  Alert and Oriented x4      Response to Learning: Able to verbalize current knowledge/experience, Capable of insight and Progressing to goal      Endings: None Reported    Modes of Intervention: Support, Socialization, Exploration and Clarifying      Discipline Responsible: Psychoeducational Specialist      Signature:  Enrico Tran, MM, MT-BC

## 2021-03-15 NOTE — GROUP NOTE
Group Therapy Note    Date: 3/15/2021    Group Start Time: 10:00 AM  Group End Time: 11:00 AM  Group Topic: Cognitive Skills    [facility] BHTARIK Loving, [facility]        Group Therapy Note    Attendees: 5         Patient's Goal: Patients were invited to participated in a Cognitive Skills / Art Therapy group on Dr. Donte Quintana \"Window of Tolerance.  Patients received a handout on the Window of Tolerance to understand and describe the brain/bodys reactions in response to stress and adversity. Patients were encouraged to engage in art making to depict what their window of tolerance looked like, to identify what took them out their regulated space, and what coping strategies could be utilized to re-enter their window of tolerance. At the end of the session, patients were encouraged to share and process their artwork with the group. Notes:  Corrie appeared attentive to information given on the topic, engaged in group discussion, and created and processed a personal depiction of her Window of Tolerance. Status After Intervention:  Improved    Participation Level:  Active Listener and Interactive    Participation Quality: Appropriate, Attentive, Sharing and Supportive      Speech:  normal      Thought Process/Content: Logical      Affective Functioning: Blunted      Mood: anxious      Level of consciousness:  Alert, Oriented x4 and Attentive      Response to Learning: Able to verbalize current knowledge/experience, Able to verbalize/acknowledge new learning, Able to retain information and Capable of insight      Endings: None Reported    Modes of Intervention: Education, Support, Socialization, Exploration, Clarifying, Problem-solving, Activity and Media      Discipline Responsible: Psychoeducational Specialist      Signature:  Macey Salas

## 2021-03-16 NOTE — BH NOTE
Treatment team met to review patient's progress in treatment. Reports progress in processing her past experience and has had improvements in mood over the past week.   Will continue in program.

## 2021-03-17 ENCOUNTER — HOSPITAL ENCOUNTER (OUTPATIENT)
Dept: PSYCHIATRY | Age: 51
Setting detail: THERAPIES SERIES
Discharge: HOME | End: 2021-03-17
Payer: MEDICAID

## 2021-03-17 VITALS — TEMPERATURE: 96.8 F

## 2021-03-17 PROCEDURE — H2020 THER BEHAV SVC, PER DIEM: HCPCS | Performed by: COUNSELOR

## 2021-03-17 NOTE — GROUP NOTE
Group Therapy Note    Date: 3/17/2021    Group Start Time:  8:30 AM  Group End Time:  9:45 AM  Group Topic: Psychotherapy    [facility]    Aleida Brandon, [facility]        Group Therapy Note    Attendees: 6         Patient's Goal:  Pt's goal was to listen and share. Notes:  Pt listened but did not share. Pt said she did not have anything she wanted to say.       Status After Intervention:  Unchanged    Participation Level: Minimal    Participation Quality: Resistant      Speech:  normal      Thought Process/Content: Logical  Linear      Affective Functioning: Congruent      Mood: anxious      Level of consciousness:  Alert      Response to Learning: Able to verbalize current knowledge/experience and Resistant      Endings: None Reported    Modes of Intervention: Education, Support, Socialization, Exploration, Clarifying, Problem-solving, Activity, Movement, Confrontation, Limit-setting and Reality-testing      Discipline Responsible: /Counselor      Signature:  Aleida Brandon, [facility]

## 2021-03-17 NOTE — GROUP NOTE
Group Therapy Note    Date: 3/17/2021    Group Start Time: 11:15 AM  Group End Time: 12:15 PM  Group Topic: Art Therapy     [facility] WILFREDO Loving, A[facility]        Group Therapy Note    Attendees: 6         Patient's Goal:  Patients were invited to participate in an Art Therapy / Psycho-Education group on boundaries. Using an art material of their choosing, patients were invited to create a door and towards the end of group, to process the symbolism of their door as it related to personal boundaries, discussing rigid, porous, and healthy boundary types. Notes:  Corrie engaged in group conversation about the types of boundaries and healthy versus unhealthy boundaries, engaged in art making to depict her boundaries, and shared and processed artwork with the group. Status After Intervention:  Improved    Participation Level:  Active Listener and Interactive    Participation Quality: Appropriate, Attentive and Sharing      Speech:  normal      Thought Process/Content: Linear      Affective Functioning: Constricted/Restricted      Mood: anxious and depressed      Level of consciousness:  Alert, Oriented x4 and Attentive      Response to Learning: Able to verbalize current knowledge/experience, Able to verbalize/acknowledge new learning, Able to retain information and Capable of insight      Endings: None Reported    Modes of Intervention: Education, Support, Socialization, Exploration, Clarifying, Problem-solving, Activity and Media      Discipline Responsible: Psychoeducational Specialist      Signature:  Macey Lopez

## 2021-03-17 NOTE — GROUP NOTE
Group Therapy Note    Date: 3/17/2021    Group Start Time: 10:00 AM  Group End Time: 11:00 AM  Group Topic: Recovery    [facility]    Nicky Negro [facility]        Group Therapy Note    Attendees: 6         Patient's Goal:  Patient will complete worksheet on People Pleasing. Will discuss how people pleasing behavior negatively impacts mental health. Notes:  Patient attend group. Completed the worksheet and discussed. Appeared to relate to the topic and gave examples from the patients life. Status After Intervention:  Improved    Participation Level:  Active Listener and Interactive    Participation Quality: Appropriate, Attentive, Sharing and Supportive    Speech:  normal    Thought Process/Content: Logical  Linear    Affective Functioning: Congruent    Mood: anxious and depressed    Level of consciousness:  Oriented x4    Response to Learning: Able to verbalize current knowledge/experience and Capable of insight    Endings: None Reported    Modes of Intervention: Education, Support, Socialization and Exploration    Discipline Responsible: /Counselor      Signature:  Nicky Negro [facility]

## 2021-03-19 ENCOUNTER — HOSPITAL ENCOUNTER (OUTPATIENT)
Dept: PSYCHIATRY | Age: 51
Setting detail: THERAPIES SERIES
Discharge: HOME | End: 2021-03-19
Payer: MEDICAID

## 2021-03-19 VITALS — TEMPERATURE: 97.5 F

## 2021-03-19 DIAGNOSIS — F23 ACUTE PSYCHOSIS (HCC): ICD-10-CM

## 2021-03-19 PROCEDURE — H2020 THER BEHAV SVC, PER DIEM: HCPCS | Performed by: COUNSELOR

## 2021-03-19 NOTE — GROUP NOTE
Group Therapy Note    Date: 3/19/2021    Group Start Time: 11:15 AM  Group End Time: 12:15 PM  Group Topic: Psychoeducation    MAOZ WILFREDO Brooks        Group Therapy Note    Topic: Fight, Flight, Freeze: Stress Response & The Body    Patients processed the body's responses to stress, panic, and fight or flight response. Group concluded with experiential intervention, progressive muscle relaxation. Attendees: 4         Notes: Active engagement in discussion of behavioral symptoms of anxiety, relating to cold hands and feet, dissociation, and racing thoughts. Active participation in experiential PMR exercise. Status After Intervention:  Improved    Participation Level:  Active Listener and Interactive    Participation Quality: Appropriate, Sharing and Supportive      Speech:  normal      Thought Process/Content: Logical      Affective Functioning: Congruent      Mood: Positive and Relaxed      Level of consciousness:  Alert and Attentive      Response to Learning: Capable of insight and Progressing to goal      Endings: None Reported    Modes of Intervention: Education, Exploration, Clarifying and Problem-solving      Discipline Responsible: Psychoeducational Specialist      Signature:  Jess Estrada MM, MT-BC

## 2021-03-22 ENCOUNTER — HOSPITAL ENCOUNTER (OUTPATIENT)
Dept: PSYCHIATRY | Age: 51
Setting detail: THERAPIES SERIES
Discharge: HOME | End: 2021-03-22
Payer: MEDICAID

## 2021-03-22 VITALS — TEMPERATURE: 98 F

## 2021-03-22 DIAGNOSIS — F44.9: ICD-10-CM

## 2021-03-22 PROCEDURE — H2020 THER BEHAV SVC, PER DIEM: HCPCS

## 2021-03-22 RX ORDER — ARIPIPRAZOLE 5 MG/1
5 TABLET ORAL DAILY
Qty: 30 TABLET | Refills: 0 | Status: SHIPPED | OUTPATIENT
Start: 2021-03-22 | End: 2021-03-31 | Stop reason: HOSPADM

## 2021-03-22 RX ORDER — TOPIRAMATE 25 MG/1
25 TABLET ORAL 2 TIMES DAILY
Qty: 60 TABLET | Refills: 0 | Status: SHIPPED | OUTPATIENT
Start: 2021-03-22 | End: 2021-03-31 | Stop reason: HOSPADM

## 2021-03-22 RX ORDER — IRBESARTAN 150 MG/1
150 TABLET ORAL DAILY
Qty: 30 TABLET | Refills: 0 | Status: SHIPPED | OUTPATIENT
Start: 2021-03-22 | End: 2021-03-31 | Stop reason: HOSPADM

## 2021-03-22 RX ORDER — TACROLIMUS 1 MG/1
3 CAPSULE ORAL 2 TIMES DAILY
Qty: 180 CAPSULE | Refills: 0 | Status: SHIPPED | OUTPATIENT
Start: 2021-03-22 | End: 2021-03-31 | Stop reason: HOSPADM

## 2021-03-22 RX ORDER — VILAZODONE HYDROCHLORIDE 40 MG/1
80 TABLET ORAL DAILY
Qty: 60 TABLET | Refills: 0 | Status: SHIPPED | OUTPATIENT
Start: 2021-03-22 | End: 2021-03-31 | Stop reason: HOSPADM

## 2021-03-22 RX ORDER — AMLODIPINE BESYLATE 5 MG/1
5 TABLET ORAL DAILY
Qty: 30 TABLET | Refills: 0 | Status: SHIPPED | OUTPATIENT
Start: 2021-03-22 | End: 2021-03-31 | Stop reason: HOSPADM

## 2021-03-22 NOTE — GROUP NOTE
Group Therapy Note    Date: 3/22/2021    Group Start Time:  8:30 AM  Group End Time:  9:45 AM  Group Topic: Psychotherapy    FARTUN Brooks        Group Therapy Note    Psychotherapy    Attendees: 4         Patient's Goal:  Pt set goal to listen and learn. Notes:  Pt explored symptoms of anxiety (heart palpitations & hair brushing). Reported using body scan exercise before sleep, expressed difficulty focusing s/t no rhythmic structure. Group facilitator educated pt on resources available to create structure in body scan. This pt provided feedback to peers while discussing family, healthy expression of anger, challenges of motivating self to be active. Status After Intervention:  Improved    Participation Level:  Active Listener and Interactive    Participation Quality: Appropriate and Supportive      Speech:  normal      Thought Process/Content: Logical      Affective Functioning: Congruent      Mood: depressed      Level of consciousness:  Alert and Oriented x4      Response to Learning: Able to verbalize current knowledge/experience, Capable of insight and Progressing to goal      Endings: None Reported    Modes of Intervention: Support, Exploration, Clarifying and Problem-solving      Discipline Responsible: Psychoeducational Specialist      Signature:  Darwin Chau MM, MT-BC

## 2021-03-22 NOTE — GROUP NOTE
Group Therapy Note    Date: 3/22/2021    Group Start Time: 11:15 AM  Group End Time: 12:15 PM  Group Topic: Art Therapy     [facility]    Nicole Loving, ATR        Group Therapy Note    Attendees: 4         Patient's Goal:  Patients were invited to participate in an Art Therapy group on grounding techniques and self-compassion. Patients were asked to practice self compassion by identifying what they needed or could give themselves during art therapy group. Patients were then invited to engage in grounding technique of working with clif to create a sculpture to help process their thoughts and emotions and to engage their senses while working with the clif. At the end of group, patients were asked to share and process their clif sculptures with the group. Notes:  Corrie identified what she needed as \"some peace,\" engaged in working with clif and in appropriate conversation with peers, and processed sculpture at the end of group session. Status After Intervention:  Improved    Participation Level:  Active Listener and Interactive    Participation Quality: Appropriate, Attentive, Sharing and Supportive      Speech:  normal      Thought Process/Content: Logical      Affective Functioning: Congruent      Mood: anxious      Level of consciousness:  Alert, Oriented x4 and Attentive      Response to Learning: Able to verbalize current knowledge/experience, Able to verbalize/acknowledge new learning, Able to retain information and Capable of insight      Endings: None Reported    Modes of Intervention: Education, Support, Socialization, Exploration, Clarifying, Problem-solving, Activity and Media      Discipline Responsible: Psychoeducational Specialist      Signature:  Cathie Godinez [facility]

## 2021-03-22 NOTE — GROUP NOTE
Group Therapy Note    Date: 3/22/2021    Group Start Time: 10:00 AM  Group End Time: 11:00 AM  Group Topic: Recovery    [facility]    Palmira Reese, [facility]        Group Therapy Note    Attendees: 4         Patient's Goal:  Patient will complete worksheet on boundaries and will discuss how they apply to mental wellbeing. Notes:  Patient attended group. Completed the worksheet and discussed in group. Verbalized a basic understanding of boundaries and shared examples of poor and healthy boundaries from life. She was supportive to her peers. Status After Intervention:  Improved    Participation Level:  Active Listener and Interactive    Participation Quality: Appropriate and Attentive    Speech:  normal    Thought Process/Content: Logical    Affective Functioning: Congruent    Mood: anxious, depressed     Level of consciousness:  Oriented x4    Response to Learning: Able to verbalize current knowledge/experience and Able to verbalize/acknowledge new learning    Endings: None Reported    Modes of Intervention: Education, Support, Socialization and Exploration    Discipline Responsible: /Counselor    Signature:  Palmira Reese, [facility]

## 2021-03-24 ENCOUNTER — HOSPITAL ENCOUNTER (OUTPATIENT)
Dept: PSYCHIATRY | Age: 51
Setting detail: THERAPIES SERIES
Discharge: HOME | End: 2021-03-24
Payer: MEDICAID

## 2021-03-24 VITALS — TEMPERATURE: 98.9 F

## 2021-03-24 DIAGNOSIS — F44.9: ICD-10-CM

## 2021-03-24 PROCEDURE — H2020 THER BEHAV SVC, PER DIEM: HCPCS

## 2021-03-24 NOTE — GROUP NOTE
Group Therapy Note    Date: 3/24/2021    Group Start Time: 10:00 AM  Group End Time: 11:00 AM  Group Topic: Recovery    [facility]        Group Therapy Note    Attendees: 6         Patient's Goal:  Patient will complete worksheet on toxic guilt and will discuss how alleviating it applies to mental wellbeing. Notes:  Patient attended group. Completed the worksheet and discussed in group. Verbalized a basic understanding of toxic guilt and how it prevents happiness. Gave examples from her life. Status After Intervention:  Improved    Participation Level:  Active Listener and Interactive    Participation Quality: Appropriate and Attentive    Speech:  normal    Thought Process/Content: Logical    Affective Functioning: Congruent    Mood: anxious, depressed     Level of consciousness:  Oriented x4    Response to Learning: Able to verbalize current knowledge/experience and Able to verbalize/acknowledge new learning    Endings: None Reported    Modes of Intervention: Education, Support, Socialization and Exploration    Discipline Responsible: /Counselor    Signature:  [facility]

## 2021-03-24 NOTE — GROUP NOTE
Group Therapy Note    Date: 3/24/2021    Group Start Time: 11:15 AM  Group End Time: 12:15 PM  Group Topic: Cognitive Skills    MAOZ OP XIMENA Loving, ATR        Group Therapy Note    Attendees: 6         Patient's Goal:  Patients were invited to participate in a Cognitive Skills group on values exploration. Patients received a handout listing personal values and were asked to identify the most important values in their lives, to consider how their values were shaped, how they influenced their relationships, and influeced decision making and future goals. Patients then received value exploration cards and were encouraged to ask one another questions and to engage in further dialouge about their values. At the end of group, patients were asked to share a short term goal directly tied to their values. Notes:  Corrie selected her central values and shared them with the group, engaged in discussion on values exploration, asking and answering questions, and identified her short term goal as \"walk, rest, and eat\" to practice her value of health. Status After Intervention:  Improved    Participation Level:  Active Listener and Interactive    Participation Quality: Appropriate, Attentive, Sharing and Supportive      Speech:  normal      Thought Process/Content: Linear      Affective Functioning: Constricted/Restricted      Mood: anxious      Level of consciousness:  Alert, Oriented x4 and Attentive      Response to Learning: Able to verbalize current knowledge/experience, Able to verbalize/acknowledge new learning, Able to retain information and Capable of insight      Endings: None Reported    Modes of Intervention: Education, Support, Socialization, Exploration, Clarifying, Problem-solving and Activity      Discipline Responsible: Psychoeducational Specialist      Signature:  Macey Cotton

## 2021-03-26 ENCOUNTER — HOSPITAL ENCOUNTER (OUTPATIENT)
Dept: PSYCHIATRY | Age: 51
Setting detail: THERAPIES SERIES
Discharge: HOME | End: 2021-03-26
Payer: MEDICAID

## 2021-03-26 VITALS — TEMPERATURE: 96.4 F

## 2021-03-26 DIAGNOSIS — F23 ACUTE PSYCHOSIS (HCC): ICD-10-CM

## 2021-03-26 PROCEDURE — H2020 THER BEHAV SVC, PER DIEM: HCPCS | Performed by: COUNSELOR

## 2021-03-26 NOTE — GROUP NOTE
Group Therapy Note    Date: 3/26/2021    Group Start Time: 10:00 AM  Group End Time: 11:00 AM  Group Topic: Recovery    [facility]    [facility]        Group Therapy Note    Attendees: 6         Patient's Goal:  Patient will complete worksheet on acceptance. Will discuss how acceptance in life contributes to positive mental health. Notes:  Patient engaged well in group. Completed the worksheet and discussed. Verbalized an understanding of the topic and gave examples of how acceptance applied to life. Received group feedback. Status After Intervention:  Improved    Participation Level:  Active Listener and Interactive    Participation Quality: Appropriate, Attentive, Sharing and Supportive    Speech:  normal    Thought Process/Content: Logical Linear    Affective Functioning: Congruent    Mood: anxious and depressed    Level of consciousness:  Oriented x4    Response to Learning: Able to verbalize current knowledge/experience    Endings: None Reported    Modes of Intervention: Education, Support, Socialization and Exploration    Discipline Responsible: /Counselor    Signature:  [facility]

## 2021-03-26 NOTE — GROUP NOTE
Group Therapy Note    Date: 3/26/2021    Group Start Time: 11:15 AM  Group End Time: 12:15 PM  Group Topic: Art Therapy     [facility]    Nicole Loving, ATR        Group Therapy Note    Attendees: 6         Patient's Goal:  Patients were invited to participate in an Art Therapy / Psycho-Education group on grounding techniques. Patients were led through a guided visualization on containment and control of distressing thoughts. Patients were then invited to engage in grounding technique of working with clif to create a sculpture to help process their thoughts and emotions and to engage their senses while working with the clif. At the end of group, patients were asked to share and process their clif sculptures with the group. Notes:  Corrie appeared to engage in the guided visualization technique, participated in art making and in conversation with peers, and shared artwork, stating she had made a \"hanging plate\" with colorful flowers and designs as a reminder for her to [facility] for the bright side of things. \"     Status After Intervention:  Improved    Participation Level:  Active Listener and Interactive    Participation Quality: Appropriate, Attentive and Sharing      Speech:  normal      Thought Process/Content: Logical      Affective Functioning: Blunted      Mood: anxious      Level of consciousness:  Alert, Oriented x4 and Attentive      Response to Learning: Able to verbalize current knowledge/experience, Able to verbalize/acknowledge new learning, Able to retain information, Capable of insight and Able to change behavior      Endings: None Reported    Modes of Intervention: Education, Support, Socialization, Exploration, Clarifying, Problem-solving, Activity and Media      Discipline Responsible: Psychoeducational Specialist      Signature:  Macey Valentino

## 2021-03-27 ENCOUNTER — IMMUNIZATION (OUTPATIENT)
Dept: PRIMARY CARE CLINIC | Age: 51
End: 2021-03-27
Payer: COMMERCIAL

## 2021-03-27 PROCEDURE — 0011A COVID-19, MODERNA VACCINE 100MCG/0.5ML DOSE: CPT | Performed by: FAMILY MEDICINE

## 2021-03-27 PROCEDURE — 91301 COVID-19, MODERNA VACCINE 100MCG/0.5ML DOSE: CPT | Performed by: FAMILY MEDICINE

## 2021-03-29 ENCOUNTER — HOSPITAL ENCOUNTER (OUTPATIENT)
Dept: PSYCHIATRY | Age: 51
Setting detail: THERAPIES SERIES
Discharge: HOME | End: 2021-03-29
Payer: MEDICAID

## 2021-03-29 VITALS — TEMPERATURE: 98.9 F

## 2021-03-29 PROCEDURE — H2020 THER BEHAV SVC, PER DIEM: HCPCS

## 2021-03-29 NOTE — GROUP NOTE
Group Therapy Note    Date: 3/29/2021    Group Start Time: 11:15 AM  Group End Time: 12:15 PM  Group Topic: Recovery    [facility]    Jaswinder Chicas, [facility]        Group Therapy Note    Attendees: 2       Patient's Goal:  Patient will complete worksheet Don't Take Things Personally. Will explore ways to detach self from other's emotions by not making it about self. Notes:  Patient engaged well in group. Completed the worksheet and discussed. Talked about ways to detach from others negative emotions. Was able to reframe others thoughts, feelings, and behaviors as a reflection of self. Status After Intervention:  Improved    Participation Level:  Active Listener and Interactive    Participation Quality: Appropriate, Attentive, Sharing and Supportive      Speech:  normal      Thought Process/Content: Logical      Affective Functioning: Congruent      Mood: anxious and depressed      Level of consciousness:  Alert and Oriented x4      Response to Learning: Able to verbalize current knowledge/experience      Endings: None Reported    Modes of Intervention: Education, Support, Socialization and Exploration      Discipline Responsible: /Counselor      Signature:  Jaswinder Chicas, [facility]
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
## 2021-03-31 ENCOUNTER — HOSPITAL ENCOUNTER (OUTPATIENT)
Dept: PSYCHIATRY | Age: 51
Setting detail: THERAPIES SERIES
Discharge: HOME | End: 2021-03-31
Payer: MEDICAID

## 2021-03-31 VITALS — SYSTOLIC BLOOD PRESSURE: 120 MMHG | RESPIRATION RATE: 18 BRPM | DIASTOLIC BLOOD PRESSURE: 76 MMHG | TEMPERATURE: 97.3 F

## 2021-03-31 DIAGNOSIS — F44.1: ICD-10-CM

## 2021-03-31 DIAGNOSIS — F44.9: ICD-10-CM

## 2021-03-31 PROBLEM — F23 ACUTE PSYCHOSIS (HCC): Status: RESOLVED | Noted: 2021-02-18 | Resolved: 2021-03-31

## 2021-03-31 PROCEDURE — 99215 OFFICE O/P EST HI 40 MIN: CPT | Performed by: PSYCHIATRY & NEUROLOGY

## 2021-03-31 PROCEDURE — H2020 THER BEHAV SVC, PER DIEM: HCPCS | Performed by: COUNSELOR

## 2021-03-31 RX ORDER — VILAZODONE HYDROCHLORIDE 40 MG/1
80 TABLET ORAL DAILY
Qty: 60 TABLET | Refills: 0 | Status: SHIPPED
Start: 2021-03-31 | End: 2021-04-26 | Stop reason: CLARIF

## 2021-03-31 RX ORDER — ARIPIPRAZOLE 10 MG/1
10 TABLET ORAL DAILY
Qty: 30 TABLET | Refills: 0 | Status: SHIPPED
Start: 2021-03-31 | End: 2021-04-26 | Stop reason: CLARIF

## 2021-03-31 RX ORDER — TOPIRAMATE 25 MG/1
25 TABLET ORAL NIGHTLY
Qty: 60 TABLET | Refills: 0 | Status: SHIPPED
Start: 2021-03-31 | End: 2021-04-26 | Stop reason: CLARIF

## 2021-03-31 NOTE — GROUP NOTE
Group Therapy Note    Date: 3/31/2021    Group Start Time: 11:15 AM  Group End Time: 12:00 PM  Group Topic: Cognitive Skills    [facility]    Aleida Brandon, [facility]        Group Therapy Note    Attendees: 4       Patient's Goal:  Pt's goal was to learn the 7 habits of happy people and how to incorporate them into Pt's life.      Notes:  Pt was engaged in group. Pt met goal.     Status After Intervention:  Improved    Participation Level:  Active Listener and Interactive    Participation Quality: Appropriate, Attentive and Sharing      Speech:  normal      Thought Process/Content: Logical  Linear      Affective Functioning: Congruent      Mood: euthymic      Level of consciousness:  Alert, Oriented x4 and Attentive      Response to Learning: Able to verbalize current knowledge/experience and Progressing to goal      Endings: None Reported    Modes of Intervention: Education, Support, Socialization, Exploration, Clarifying, Problem-solving, Activity, Movement, Confrontation, Limit-setting and Reality-testing      Discipline Responsible: /Counselor      Signature:  Gary Chao 53

## 2021-03-31 NOTE — GROUP NOTE
Group Therapy Note    Date: 3/31/2021    Group Start Time:  8:45 AM  Group End Time:  9:45 AM  Group Topic: Psychotherapy    [facility]    Aleida Brandon, [facility]        Group Therapy Note    Attendees: 5         Patient's Goal:  Pt's goal was to process her last day. Notes:  Pt was engaged in group. Pt was able to identified  the changes she has made since starting the program.  Pt met goal.     Status After Intervention:  Improved    Participation Level:  Active Listener and Interactive    Participation Quality: Appropriate, Attentive and Sharing      Speech:  normal      Thought Process/Content: Logical  Linear      Affective Functioning: Congruent      Mood: euthymic      Level of consciousness:  Alert, Oriented x4 and Attentive      Response to Learning: Able to verbalize current knowledge/experience, Able to verbalize/acknowledge new learning, Able to retain information, Capable of insight, Able to change behavior and Progressing to goal      Endings: None Reported    Modes of Intervention: Education, Support, Socialization, Exploration, Clarifying, Problem-solving, Activity, Movement, Confrontation, Limit-setting and Reality-testing      Discipline Responsible: /Counselor      Signature:  Aleida Brandon, [facility]
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
## 2021-03-31 NOTE — GROUP NOTE
Group Therapy Note    Date: 3/31/2021    Group Start Time: 10:00 AM  Group End Time: 11:00 AM  Group Topic: Recovery    [facility]    Jazlyn Beck, [facility]        Group Therapy Note    Attendees: 5         Patient's Goal:  Patient will complete worksheet on boundaries and will discuss how they apply to mental wellbeing. Notes:  Patient attended group. Completed the worksheet and discussed in group. Verbalized a basic understanding of boundaries and shared examples of poor and healthy boundaries from life. Received feedback and support from peers. Status After Intervention:  Improved    Participation Level:  Active Listener and Interactive    Participation Quality: Appropriate and Attentive    Speech:  normal    Thought Process/Content: Logical    Affective Functioning: Congruent    Mood: anxious, depressed     Level of consciousness:  Oriented x4    Response to Learning: Able to verbalize current knowledge/experience and Able to verbalize/acknowledge new learning    Endings: None Reported    Modes of Intervention: Education, Support, Socialization and Exploration    Discipline Responsible: /Counselor    Signature:  Jazlyn Beck, [facility]

## 2021-04-24 ENCOUNTER — IMMUNIZATION (OUTPATIENT)
Dept: PRIMARY CARE CLINIC | Age: 51
End: 2021-04-24
Payer: MEDICAID

## 2021-04-24 PROCEDURE — 0012A COVID-19, MODERNA VACCINE 100MCG/0.5ML DOSE: CPT | Performed by: FAMILY MEDICINE

## 2021-04-24 PROCEDURE — 91301 COVID-19, MODERNA VACCINE 100MCG/0.5ML DOSE: CPT | Performed by: FAMILY MEDICINE

## 2021-04-26 ENCOUNTER — OFFICE VISIT (OUTPATIENT)
Dept: PRIMARY CARE CLINIC | Age: 51
End: 2021-04-26

## 2021-04-26 VITALS
WEIGHT: 190 LBS | HEIGHT: 64 IN | OXYGEN SATURATION: 98 % | TEMPERATURE: 97.4 F | SYSTOLIC BLOOD PRESSURE: 110 MMHG | DIASTOLIC BLOOD PRESSURE: 78 MMHG | HEART RATE: 72 BPM | BODY MASS INDEX: 32.44 KG/M2

## 2021-04-26 DIAGNOSIS — F44.1: ICD-10-CM

## 2021-04-26 DIAGNOSIS — E66.9 OBESITY (BMI 30.0-34.9): ICD-10-CM

## 2021-04-26 DIAGNOSIS — E78.49 OTHER HYPERLIPIDEMIA: ICD-10-CM

## 2021-04-26 DIAGNOSIS — Z12.31 ENCOUNTER FOR SCREENING MAMMOGRAM FOR MALIGNANT NEOPLASM OF BREAST: ICD-10-CM

## 2021-04-26 DIAGNOSIS — R73.03 PREDIABETES: ICD-10-CM

## 2021-04-26 DIAGNOSIS — F41.9 ANXIETY: ICD-10-CM

## 2021-04-26 DIAGNOSIS — I10 ESSENTIAL HYPERTENSION: ICD-10-CM

## 2021-04-26 DIAGNOSIS — M06.9 RHEUMATOID ARTHRITIS IN REMISSION (HCC): Primary | ICD-10-CM

## 2021-04-26 DIAGNOSIS — F33.9 RECURRENT MAJOR DEPRESSIVE DISORDER, REMISSION STATUS UNSPECIFIED (HCC): ICD-10-CM

## 2021-04-26 PROBLEM — F32.9 MAJOR DEPRESSIVE DISORDER: Status: ACTIVE | Noted: 2018-09-19

## 2021-04-26 PROBLEM — Z90.710 HISTORY OF TOTAL ABDOMINAL HYSTERECTOMY AND BILATERAL SALPINGO-OOPHORECTOMY: Status: ACTIVE | Noted: 2018-10-19

## 2021-04-26 PROBLEM — Z90.79 HISTORY OF TOTAL ABDOMINAL HYSTERECTOMY AND BILATERAL SALPINGO-OOPHORECTOMY: Status: ACTIVE | Noted: 2018-10-19

## 2021-04-26 PROBLEM — Z90.722 HISTORY OF TOTAL ABDOMINAL HYSTERECTOMY AND BILATERAL SALPINGO-OOPHORECTOMY: Status: ACTIVE | Noted: 2018-10-19

## 2021-04-26 PROCEDURE — 99204 OFFICE O/P NEW MOD 45 MIN: CPT | Performed by: NURSE PRACTITIONER

## 2021-04-26 RX ORDER — FLUOXETINE HYDROCHLORIDE 20 MG/1
20 CAPSULE ORAL DAILY
COMMUNITY

## 2021-04-26 ASSESSMENT — PATIENT HEALTH QUESTIONNAIRE - PHQ9
1. LITTLE INTEREST OR PLEASURE IN DOING THINGS: 0
2. FEELING DOWN, DEPRESSED OR HOPELESS: 0
SUM OF ALL RESPONSES TO PHQ QUESTIONS 1-9: 0
SUM OF ALL RESPONSES TO PHQ QUESTIONS 1-9: 0
SUM OF ALL RESPONSES TO PHQ9 QUESTIONS 1 & 2: 0
SUM OF ALL RESPONSES TO PHQ QUESTIONS 1-9: 0

## 2021-04-26 ASSESSMENT — ENCOUNTER SYMPTOMS
VOMITING: 0
SORE THROAT: 0
DIARRHEA: 0
COUGH: 0
VOICE CHANGE: 0
SHORTNESS OF BREATH: 0
EYE DISCHARGE: 0
COLOR CHANGE: 0
NAUSEA: 0
EYE PAIN: 0
CHEST TIGHTNESS: 0
BLOOD IN STOOL: 0
TROUBLE SWALLOWING: 0
EYE ITCHING: 0
WHEEZING: 0
ABDOMINAL DISTENTION: 0
ABDOMINAL PAIN: 0
CONSTIPATION: 0
BACK PAIN: 0

## 2021-04-26 NOTE — PATIENT INSTRUCTIONS
Stop losartan. Continue on amlodipine 5mg and irbesartan 150mg daily.      Continue per psychiatry for medication management    Call to schedule apt with Dr Mary Tenorio (rheumatology)

## 2021-04-26 NOTE — PROGRESS NOTES
ENCOUNTER DATE: 4/26/2021     NAME: Sherman Landry   AGE: 45 y.o. GENDER: female   YOB: 1970    Patient Active Problem List   Diagnosis    Essential hypertension    Rheumat[facility] ([facility]    Obesity (BMI 30.0-34. 9)    Dissociative amnesia with dissociative fugue ([facility]    History of total abdominal hysterectomy and bilateral salpingo-oophorectomy    Major depressive disorder    Other hyperlipidemia    Anxiety    Prediabetes      Allergies   Allergen Reactions    Aspirin Hives     Current Outpatient Medications on File Prior to Visit   Medication Sig Dispense Refill    FLUoxetine (PROZAC) 20 MG capsule Take 20 mg by mouth daily      sarilumab (KEVZARA) 200 MG/1. 14ML SOAJ injection Inject 1 pen into the skin every 14 days      ARIPiprazole (ABILIFY) 10 MG tablet Take 1 tablet by mouth daily 30 tablet 0    amLODIPine (NORVASC) 5 MG tablet Take 1 tablet by mouth daily 30 tablet 0    tacrolimus (PROGRAF) 1 MG capsule Take 3 capsules by mouth 2 times daily 180 capsule 0    topiramate (TOPAMAX) 25 MG tablet Take 1 tablet by mouth 2 times daily 60 tablet 0    irbesartan (AVAPRO) 150 MG tablet Take 1 tablet by mouth nightly 30 tablet 0     No current facility-administered medications on file prior to visit.          Past Medical History:   Diagnosis Date    Anxiety     Depression     Hypertension     PTSD (post-traumatic stress disorder)     RA (rheumatoid arthritis) (HCC)      Past Surgical History:   Procedure Laterality Date    CHOLECYSTECTOMY      HYSTERECTOMY        Family History   Problem Relation Age of Onset    Cancer Mother     Cancer Brother     Cancer Maternal Grandmother     Alcohol Abuse Maternal Grandfather      Social History     Tobacco Use    Smoking status: Never Smoker    Smokeless tobacco: Never Used   Substance Use Topics    Alcohol use: Not Currently      Patient Care Team:  SY Pierre CNP as PCP - General (Family Nurse Normal appearance. She is well-developed and normal weight. She is not diaphoretic. HENT:      Head: Normocephalic and atraumatic. Neck:      Musculoskeletal: Normal range of motion and neck supple. Thyroid: No thyromegaly. Vascular: No carotid bruit or JVD. Trachea: No tracheal deviation. Cardiovascular:      Rate and Rhythm: Normal rate and regular rhythm. Heart sounds: Normal heart sounds. No murmur. No friction rub. Pulmonary:      Effort: Pulmonary effort is normal. No respiratory distress. Breath sounds: Normal breath sounds. No stridor. No wheezing, rhonchi or rales. Chest:      Chest wall: No tenderness. Abdominal:      General: Abdomen is flat. There is no distension. Palpations: Abdomen is soft. Musculoskeletal: Normal range of motion. General: No deformity. Right lower leg: No edema. Left lower leg: No edema. Lymphadenopathy:      Cervical: No cervical adenopathy. Skin:     General: Skin is warm and dry. Coloration: Skin is not pale. Findings: No erythema or rash. Neurological:      General: No focal deficit present. Mental Status: She is alert and oriented to person, place, and time. Cranial Nerves: No cranial nerve deficit. Motor: No weakness. Gait: Gait normal.   Psychiatric:         Mood and Affect: Mood normal.         Behavior: Behavior normal.         Thought Content:  Thought content normal.         Judgment: Judgment normal.        Lab Results   Component Value Date    LABA1C 6.4 02/20/2021     Lab Results   Component Value Date    .0 02/20/2021     Lab Results   Component Value Date    CHOL 165 02/20/2021     Lab Results   Component Value Date    TRIG 119 02/20/2021     Lab Results   Component Value Date    HDL 41 02/20/2021     Lab Results   Component Value Date    LDLCALC 100 (H) 02/20/2021     Lab Results   Component Value Date    LABVLDL 24 02/20/2021     No results found for: CHOLHDLRATIO  Lab Results   Component Value Date    WBC 8.0 02/18/2021    HGB 12.7 02/18/2021    HCT 39.0 02/18/2021    MCV 83.5 02/18/2021     02/18/2021     Lab Results   Component Value Date    TSH 0.51 02/20/2021     ASSESSMENT/PLAN:  1. Rheumatoid arthritis in remission ([facility]  Refer to rheum for medication management. Will obtain previous rheum records from [place] [address]. Stable on current regimen. Advised to call if she is running low on meds, I agreed to refill RA meds until she is able to see our rheumatologist   Patient will call to schedule apt with rheum  - Ronna Solano MD, Rheumatology, [facility]    2. Essential hypertension  Stable. Unsure why she is on 2 ARBs. Will dc losartan. Continue on irbesartan 150mg and amlodipine 5mg. Continue to monitor BP at home. Call if >140/90    3. Dissociative amnesia with dissociative fugue ([facility]  Continue per psychiatry for medication management. Hospital notes and outpatient notes reviewed. 4. Recurrent major depressive disorder, remission status unspecified ([facility]  Continue per psychiatry for medication management. Hospital notes and outpatient notes reviewed    5. Anxiety  Continue per psychiatry for medication management. Hospital notes and outpatient notes reviewed    6. Obesity (BMI 30.0-34. 9)  Continue with dietary efforts. Recent labs reviewed. 7. Encounter for screening mammogram for malignant neoplasm of breast  Will contact patient with self pay breast cancer screening program info. Order given to patient. - ZARA DIGITAL SCREEN W OR WO CAD BILATERAL; Future    8. Other hyperlipidemia  Recent labs reviewed. 9. Prediabetes  Recent labs reviewed. A1C 6.4. continue with dietary efforts. Will monitor. Return in about 3 months (around 7/26/2021) for chronic visit.      Electronically signed by SY Che CNP on 4/26/2021 at 5:44 PM

## 2021-04-27 ENCOUNTER — TELEPHONE (OUTPATIENT)
Dept: PRIMARY CARE CLINIC | Age: 51
End: 2021-04-27

## 2021-04-27 NOTE — TELEPHONE ENCOUNTER
Left message for pt to call back   Please give her information for scheduling mammogram  Assistance for financial need call [phone] option 1

## 2021-05-21 RX ORDER — AMLODIPINE BESYLATE 5 MG/1
5 TABLET ORAL DAILY
Qty: 30 TABLET | Refills: 0 | Status: CANCELLED | OUTPATIENT
Start: 2021-05-21

## 2021-05-21 NOTE — TELEPHONE ENCOUNTER
Pt is requesting a refill on the   amLODIPine (NORVASC) 5 MG tablet [[id number]]     Order Details  Dose: 5 mg       [facility]ando [facility] [address] [place] [address]/Lorraine Mcfarlane [address] - F [phone]

## 2021-05-22 RX ORDER — AMLODIPINE BESYLATE 5 MG/1
5 TABLET ORAL DAILY
Qty: 30 TABLET | Refills: 1 | Status: SHIPPED | OUTPATIENT
Start: 2021-05-22 | End: 2021-07-26 | Stop reason: SDUPTHER

## 2021-05-25 NOTE — TELEPHONE ENCOUNTER
Medication:   Requested Prescriptions     Pending Prescriptions Disp Refills    irbesartan (AVAPRO) 150 MG tablet 30 tablet 0     Sig: Take 1 tablet by mouth nightly       Last Filled:      Patient Phone Number: [phone] (home)     Last appt: 4/26/2021   Next appt: 7/26/2021    Last BMP:   Lab Results   Component Value Date     02/18/2021    K 3.7 02/18/2021     02/18/2021    CO2 21 02/18/2021    ANIONGAP 13 02/18/2021    GLUCOSE 160 02/18/2021    BUN 15 02/18/2021    CREATININE 1.1 02/18/2021    LABGLOM 52 02/18/2021    GFRAA >60 02/18/2021    CALCIUM 9.8 02/18/2021      Last CMP:   Lab Results   Component Value Date     02/18/2021    K 3.7 02/18/2021     02/18/2021    CO2 21 02/18/2021    ANIONGAP 13 02/18/2021    GLUCOSE 160 02/18/2021    BUN 15 02/18/2021    CREATININE 1.1 02/18/2021    LABGLOM 52 02/18/2021    GFRAA >60 02/18/2021    PROT 7.6 02/18/2021    LABALBU 4.6 02/18/2021    BILITOT 0.5 02/18/2021    ALKPHOS 124 02/18/2021    ALT 26 02/18/2021    AST 24 02/18/2021     Last Renal Function:   Lab Results   Component Value Date     02/18/2021    K 3.7 02/18/2021     02/18/2021    CO2 21 02/18/2021    GLUCOSE 160 02/18/2021    BUN 15 02/18/2021    CREATININE 1.1 02/18/2021    LABALBU 4.6 02/18/2021    CALCIUM 9.8 02/18/2021    GFRAA >60 02/18/2021       Last OARRS: No flowsheet data found.     Preferred Pharmacy:   [facility]/pharmacy #[id number]- [address] [facility]  Phone: [phone] Fax: [phone]    [facility] [address] [facility], [address]  [address] [facility] [address]  Phone: [phone] Fax: [phone]

## 2021-05-25 NOTE — TELEPHONE ENCOUNTER
Pt needs a refill for this med please :    irbesartan (AVAPRO) 150 MG tablet [[id number]    [facility]/pharmacy #[id number]- [address] 4.26.21

## 2021-05-27 RX ORDER — IRBESARTAN 150 MG/1
150 TABLET ORAL NIGHTLY
Qty: 30 TABLET | Refills: 2 | Status: SHIPPED | OUTPATIENT
Start: 2021-05-27 | End: 2021-07-26 | Stop reason: SDUPTHER

## 2021-06-28 NOTE — PROGRESS NOTES
Negrito Crisostomo MD  [facility]) Physicians - Rheumatology    [x] [facility]:  [address], [address]  Fatou HernandezWilliam Argelia [] Vanessa Office:  [facility], [address] [facility]   Office: [phone]  Fax: [phone]     RHEUMATOLOGY CONSULT NOTE    HISTORY OF PRESENT ILLNESS:    The pt is a 45 y.o. female referred by SY Holman CNP for RA. Current rheum meds:  Prednisone: per pt took frequent tapers 1-2x/month over the past 10 yrs, currently takes 20 mg on the day of each Kevzara injection to avoid injection site reaction  Kevara 200 mg SC Q2wk: per pt started in 9/2020  Tacrolimus 1 mg BID: per pt written for 3 mg BID but she can only afford 1 mg BID, started in 9/2020    Prior rheum meds:  HCQ: per pt took for 2 yrs, caused severe GI s/e and hair loss  MTX: per pt caused GI s/e and hair loss  Arava: per pt took for 3 yrs, stopped working  SSZ: caused vomiting  Enbrel: per pt took for 3 yrs, controlled RA at first but stopped working, switched to Humira  Humira: per pt worked for 1 yr, stopped working  Frandy Curio: per pt took for 6 months, ineffective  Orencia SC: took for 2.5 yrs, per pt worked for a period of time then developed reaction including throat tightness and \"welts\"  Gabapentin: unclear why this was stopped    Pt states she initially developed arthralgias in her hands, knees and ankles at age 27. Pt states she was initially diagnosed w/ seropositive RA and fibromyalgia by a rheumatologist in [place] at age 27. Pt states her RA has been very difficulty to control. She previously required frequent Prednisone tapers 1-2x/month over the past 10 yrs. She tells me she's tried and failed multiple biologics including Enbrel, Humira, Lee Curio and SC Orencia. Most recently her rheumatologist in [place] started her on Tacrolimus w/ Kayla Torres in 9/2020 which she feels control her RA Sx very well - \"I feel like I'm in heaven right now, I don't get many flares\".  She tells me she is self pay and cannot afford the full dose of Tacrolimus. She currently has arthralgias in her R hand. Denies any arthralgias in her L hand. Denies joint swelling. Morning stiffness lasts 15-20 min. Most recent flare was 3 wks ago. Denies known FHx of autoimmune disease. She is a never smoker. She works as a . She most recently removed from [place] to [address] [place] [address] in 2/2021. She is originally from [place].     REVIEW OF SYSTEMS:    Constitutional: +chronic fatigue, denies fever/chills, night sweats, unintentional weight loss  Integumentary: denies photosensitivity, rash, patchy alopecia, or Sx of Raynaud's phenomenon  Eyes: +chronic dry eyes confirmed by eye doctor, denies redness or pain, visual disturbance, or floaters  Ears: denies hearing loss, tinnitus, vertigo, or recurrent ear infections  Nose: denies nasal ulcers or recurrent sinusitis  Oral cavity: +chonic dry mouth, denies oral ulcers  Cardiovascular: denies CP, palpitations, Hx of pericardial effusion or pericarditis  Respiratory: denies SOB, cough, hemoptysis, or pleurisy  Gastrointestinal: denies heart burn, dysphagia or esophageal dysmotility, denies change in bowel habits or Sx of IBD  Hematologic/Lymphatic: denies abnormal bruising or bleeding, denies Hx of blood clots or recurrent miscarriages, denies swollen LNs  Musculoskeletal:  refer to above HPI   Neurological: denies focal weakness, paresthesias/hyperesthesias or change in sensation, denies Hx of seizure, denies change in gait, balance, or memory    Past Medical History:   Diagnosis Date    Anxiety     Depression     Hypertension     PTSD (post-traumatic stress disorder)     RA (rheumatoid arthritis) ([facility]         Past Surgical History:   Procedure Laterality Date    CHOLECYSTECTOMY      HYSTERECTOMY         Social History     Socioeconomic History    Marital status:      Spouse name: Not on file    Number of children: 3    Years of education: none    Highest skin every 14 days, Disp: , Rfl:     ARIPiprazole (ABILIFY) 10 MG tablet, Take 1 tablet by mouth daily, Disp: 30 tablet, Rfl: 0    topiramate (TOPAMAX) 25 MG tablet, Take 1 tablet by mouth 2 times daily, Disp: 60 tablet, Rfl: 0    ALLERGIES:  Aspirin    PHYSICAL EXAM:    Vitals:    /86   Ht 5' 6.5\" (1.689 m)   Wt 188 lb (85.3 kg)   LMP  (LMP Unknown)   BMI 29.89 kg/m²     GEN: AAOx3, in NAD, well-appearing  HEAD: normocephalic, atraumatic  EYES: no injection or icterus  NOSE: no nasal ulcers or nasal drainage  ORAL CAVITY: moist oral mucosa w/ good saliva pooling, no oral lesions, no evidence of thrush, no evidence of parotid gland enlargement  NECK: supple w/ FROM, of evidence of lymphadenopathy  CVS: RRR  LUNGS: in no acute respiratory distress, CTAB  ABDOMEN: +BS, soft, NT/ND  LYMPH: no cervical, supraclavicular or axillary LAD  MSK: there is diffuse myofascial tenderness w/ numerous tender points  Spine: no kyphosis or lordosis, diffuse myofascial tenderness as above  Upper extremities:   Hands: no active synovitis or dactylitis, R 2-4th MCP joints w/ mild synovial proliferation slightly TTP, R 4th PIP joint w/ mild bogginess TTP, rest of PIP joints w/o synovitis NTTP, there is sparing of the DIP joints NTTP, full fist formation w/ good  strength   Wrist: no synovitis in the wrist joints b/l, slight limitation to wrist flexion b/l   Elbow: no synovitis or bursitis, FROM   Shoulders: no pain or swelling or warmth on palpation, FROM  Lower extremities:   Knees: R knee w/o swelling FROM, L knee joint w/ bony enlargement medial joint line slightly TTP   Ankles: b/l ankles w/ synovial proliferation no synovitis, FROM, Achilles tendons w/o swelling or warmth NTTP   Feet: no toe swelling or pain or warmth on palpation w/ FROM, negative MTP squeeze test  INTEGUMENT: no rash or psoriatic lesions, no petechiae, bruises, or palpable purpura, no patchy alopecia, no nail or periungual changes, no clubbing or digital ulcers    Labs: I personally reviewed prior labs including:    Lab Results   Component Value Date    WBC 8.0 02/18/2021    HGB 12.7 02/18/2021    HCT 39.0 02/18/2021    MCV 83.5 02/18/2021     02/18/2021    LYMPHOPCT 25.8 02/18/2021    RBC 4.67 02/18/2021    MCH 27.3 02/18/2021    MCHC 32.7 02/18/2021    RDW 14.5 02/18/2021     Lab Results   Component Value Date     02/18/2021    K 3.7 02/18/2021     02/18/2021    CO2 21 02/18/2021    BUN 15 02/18/2021    CREATININE 1.1 02/18/2021    GLUCOSE 160 (H) 02/18/2021    CALCIUM 9.8 02/18/2021    PROT 7.6 02/18/2021    LABALBU 4.6 02/18/2021    BILITOT 0.5 02/18/2021    ALKPHOS 124 02/18/2021    AST 24 02/18/2021    ALT 26 02/18/2021    LABGLOM 52 (A) 02/18/2021    GFRAA >60 02/18/2021     Above results were discussed w/ the pt in detail during today's visit. ASSESSMENT/PLAN:  45 y.o. female w/ prior Hx of RA and fibromyalgia. PMHx pertinent for HTN, pre-diabetes, HLD, PTSD, anxiety and severe depression. 1. Rheumatoid arthritis involving multiple sites, unspecified whether rheumatoid factor present ([facility]  - no active synovitis appreciated on exam today, RA appears to be well controlled on Kevzara and Tacrolimus currently. Discussed her mild JOSÉ in 2/2021, will repeat safety labs now. For now cont low dose Tacrolimus and Kevzara  - C-Reactive Protein; Future  - Uric Acid; Future  - Hepatitis B Core Antibody, Total; Future  - Hepatitis B Surface Antigen; Future  - Hepatitis C Antibody; Future  - JOHANNA Reflex to Antibody Cascade; Future  - Anti SSA; Future  - Anti SSB; Future  - Cyclic Citrul Peptide Antibody, IgG; Future  - Rheumatoid Factor; Future  - Vitamin D 25 Hydroxy; Future  - XR HAND LEFT (MIN 3 VIEWS); Future  - XR HAND RIGHT (MIN 3 VIEWS); Future  - tacrolimus (PROGRAF) 1 MG capsule; Take 1 capsule by mouth 2 times daily  Dispense: 60 capsule; Refill: 0  - XR Chest PA and Lateral; Future    2.  High risk medication use  - CBC Auto Differential; Future  - Comprehensive Metabolic Panel; Future  - Hepatitis B Core Antibody, Total; Future  - Hepatitis B Surface Antigen; Future  - Hepatitis C Antibody; Future  - Quantiferon, Incubated; Future  - Radha Roup Virus (EBV) Antibody Panel I; Future    3. Encounter for therapeutic drug monitoring  - CBC Auto Differential; Future  - Comprehensive Metabolic Panel; Future    4. JOSÉ (acute kidney injury) ([facility]  - avoid NSAIDs  - Comprehensive Metabolic Panel; Future    5. Sicca complex (HCC)  - JOHANNA Reflex to Antibody Cascade; Future  - Anti SSA; Future  - Anti SSB; Future  - Cyclic Citrul Peptide Antibody, IgG; Future  - Rheumatoid Factor; Future    6. Chronic fatigue  - JOHANNA Reflex to Antibody Cascade; Future  - Anti SSA; Future  - Anti SSB; Future    7. Fibromyalgia  - she had numerous tender points on exam today, will discuss fibromyalgia Tx at her next f/u visit    Return in about 2 weeks (around 7/14/2021) for lab result discussion and treatment plan, medication monitoring. 6/30/2021 11:37 AM      Sending consult note to referring provider SY Kim - CNP. Thank you very much for allowing me to participate in this pt's care. Please do not hesitate to contact me if I can be of further assistance. NOTE: This report is transcribed by using voice recognition software dragon. Every effort is made to ensure accuracy; however, inadvertent computerized transcription errors may be present.

## 2021-06-30 ENCOUNTER — HOSPITAL ENCOUNTER (OUTPATIENT)
Dept: GENERAL RADIOLOGY | Age: 51
Discharge: HOME | End: 2021-06-30
Payer: MEDICAID

## 2021-06-30 ENCOUNTER — OFFICE VISIT (OUTPATIENT)
Dept: RHEUMATOLOGY | Age: 51
End: 2021-06-30

## 2021-06-30 ENCOUNTER — HOSPITAL ENCOUNTER (OUTPATIENT)
Age: 51
Discharge: HOME | End: 2021-06-30
Payer: MEDICAID

## 2021-06-30 VITALS
BODY MASS INDEX: 29.51 KG/M2 | HEIGHT: 67 IN | DIASTOLIC BLOOD PRESSURE: 86 MMHG | WEIGHT: 188 LBS | SYSTOLIC BLOOD PRESSURE: 130 MMHG

## 2021-06-30 DIAGNOSIS — M06.9 RHEUMATOID ARTHRITIS INVOLVING MULTIPLE SITES, UNSPECIFIED WHETHER RHEUMATOID FACTOR PRESENT (HCC): ICD-10-CM

## 2021-06-30 DIAGNOSIS — M06.9 RHEUMATOID ARTHRITIS INVOLVING MULTIPLE SITES, UNSPECIFIED WHETHER RHEUMATOID FACTOR PRESENT (HCC): Primary | ICD-10-CM

## 2021-06-30 DIAGNOSIS — M79.7 FIBROMYALGIA: ICD-10-CM

## 2021-06-30 DIAGNOSIS — N17.9 AKI (ACUTE KIDNEY INJURY) (HCC): ICD-10-CM

## 2021-06-30 DIAGNOSIS — Z51.81 ENCOUNTER FOR THERAPEUTIC DRUG MONITORING: ICD-10-CM

## 2021-06-30 DIAGNOSIS — R53.82 CHRONIC FATIGUE: ICD-10-CM

## 2021-06-30 DIAGNOSIS — M35.00 SICCA COMPLEX (HCC): ICD-10-CM

## 2021-06-30 DIAGNOSIS — Z79.899 HIGH RISK MEDICATION USE: ICD-10-CM

## 2021-06-30 PROCEDURE — 99244 OFF/OP CNSLTJ NEW/EST MOD 40: CPT | Performed by: INTERNAL MEDICINE

## 2021-06-30 PROCEDURE — 71046 X-RAY EXAM CHEST 2 VIEWS: CPT

## 2021-06-30 PROCEDURE — 73130 X-RAY EXAM OF HAND: CPT

## 2021-06-30 RX ORDER — TACROLIMUS 1 MG/1
1 CAPSULE ORAL 2 TIMES DAILY
Qty: 60 CAPSULE | Refills: 0 | Status: SHIPPED | OUTPATIENT
Start: 2021-06-30 | End: 2021-07-30

## 2021-07-26 ENCOUNTER — OFFICE VISIT (OUTPATIENT)
Dept: PRIMARY CARE CLINIC | Age: 51
End: 2021-07-26

## 2021-07-26 VITALS
HEART RATE: 82 BPM | SYSTOLIC BLOOD PRESSURE: 120 MMHG | WEIGHT: 189 LBS | DIASTOLIC BLOOD PRESSURE: 78 MMHG | HEIGHT: 67 IN | TEMPERATURE: 97.8 F | BODY MASS INDEX: 29.66 KG/M2 | OXYGEN SATURATION: 98 %

## 2021-07-26 DIAGNOSIS — I10 ESSENTIAL HYPERTENSION: Primary | ICD-10-CM

## 2021-07-26 DIAGNOSIS — F33.9 RECURRENT MAJOR DEPRESSIVE DISORDER, REMISSION STATUS UNSPECIFIED (HCC): ICD-10-CM

## 2021-07-26 DIAGNOSIS — R73.03 PREDIABETES: ICD-10-CM

## 2021-07-26 DIAGNOSIS — M06.9 RHEUMATOID ARTHRITIS IN REMISSION (HCC): ICD-10-CM

## 2021-07-26 DIAGNOSIS — F41.9 ANXIETY: ICD-10-CM

## 2021-07-26 PROCEDURE — 99214 OFFICE O/P EST MOD 30 MIN: CPT | Performed by: NURSE PRACTITIONER

## 2021-07-26 RX ORDER — AMLODIPINE BESYLATE 5 MG/1
5 TABLET ORAL DAILY
Qty: 90 TABLET | Refills: 1 | Status: SHIPPED | OUTPATIENT
Start: 2021-07-26 | End: 2021-10-29 | Stop reason: SDUPTHER

## 2021-07-26 RX ORDER — IRBESARTAN 150 MG/1
150 TABLET ORAL NIGHTLY
Qty: 90 TABLET | Refills: 1 | Status: SHIPPED | OUTPATIENT
Start: 2021-07-26 | End: 2021-09-07

## 2021-07-26 SDOH — ECONOMIC STABILITY: FOOD INSECURITY: WITHIN THE PAST 12 MONTHS, THE FOOD YOU BOUGHT JUST DIDN'T LAST AND YOU DIDN'T HAVE MONEY TO GET MORE.: NEVER TRUE

## 2021-07-26 SDOH — ECONOMIC STABILITY: TRANSPORTATION INSECURITY
IN THE PAST 12 MONTHS, HAS THE LACK OF TRANSPORTATION KEPT YOU FROM MEDICAL APPOINTMENTS OR FROM GETTING MEDICATIONS?: NO

## 2021-07-26 SDOH — ECONOMIC STABILITY: FOOD INSECURITY: WITHIN THE PAST 12 MONTHS, YOU WORRIED THAT YOUR FOOD WOULD RUN OUT BEFORE YOU GOT MONEY TO BUY MORE.: NEVER TRUE

## 2021-07-26 SDOH — ECONOMIC STABILITY: TRANSPORTATION INSECURITY
IN THE PAST 12 MONTHS, HAS LACK OF TRANSPORTATION KEPT YOU FROM MEETINGS, WORK, OR FROM GETTING THINGS NEEDED FOR DAILY LIVING?: NO

## 2021-07-26 SDOH — ECONOMIC STABILITY: HOUSING INSECURITY
IN THE LAST 12 MONTHS, WAS THERE A TIME WHEN YOU DID NOT HAVE A STEADY PLACE TO SLEEP OR SLEPT IN A SHELTER (INCLUDING NOW)?: NO

## 2021-07-26 SDOH — ECONOMIC STABILITY: INCOME INSECURITY: IN THE LAST 12 MONTHS, WAS THERE A TIME WHEN YOU WERE NOT ABLE TO PAY THE MORTGAGE OR RENT ON TIME?: NO

## 2021-07-26 SDOH — ECONOMIC STABILITY: HOUSING INSECURITY: IN THE LAST 12 MONTHS, HOW MANY PLACES HAVE YOU LIVED?: 1

## 2021-07-26 ASSESSMENT — PATIENT HEALTH QUESTIONNAIRE - PHQ9
SUM OF ALL RESPONSES TO PHQ9 QUESTIONS 1 & 2: 0
SUM OF ALL RESPONSES TO PHQ QUESTIONS 1-9: 0
1. LITTLE INTEREST OR PLEASURE IN DOING THINGS: NOT AT ALL
SUM OF ALL RESPONSES TO PHQ9 QUESTIONS 1 & 2: 0
DEPRESSION UNABLE TO ASSESS: YES
2. FEELING DOWN, DEPRESSED OR HOPELESS: NOT AT ALL
SUM OF ALL RESPONSES TO PHQ QUESTIONS 1-9: 0
2. FEELING DOWN, DEPRESSED OR HOPELESS: 0
1. LITTLE INTEREST OR PLEASURE IN DOING THINGS: 0
SUM OF ALL RESPONSES TO PHQ QUESTIONS 1-9: 0

## 2021-07-26 ASSESSMENT — ENCOUNTER SYMPTOMS
ABDOMINAL PAIN: 0
CONSTIPATION: 0
ABDOMINAL DISTENTION: 0
COLOR CHANGE: 0
SORE THROAT: 0
COUGH: 0
NAUSEA: 0
BLOOD IN STOOL: 0
SHORTNESS OF BREATH: 0
DIARRHEA: 0
BACK PAIN: 0
WHEEZING: 0
VOICE CHANGE: 0
VOMITING: 0
TROUBLE SWALLOWING: 0
CHEST TIGHTNESS: 0

## 2021-07-26 ASSESSMENT — SOCIAL DETERMINANTS OF HEALTH (SDOH): HOW HARD IS IT FOR YOU TO PAY FOR THE VERY BASICS LIKE FOOD, HOUSING, MEDICAL CARE, AND HEATING?: NOT HARD AT ALL

## 2021-07-26 ASSESSMENT — LIFESTYLE VARIABLES: HOW OFTEN DO YOU HAVE A DRINK CONTAINING ALCOHOL: NEVER

## 2021-07-26 NOTE — PROGRESS NOTES
psych every 2 mo. Following with therapist once per month. No further disorientation. Mood has been stable. On abilify 5mg, topamax 25mg BID and prozac. PREDIABETIC:   Last a1c 6.4    MAMMO:  Due. Has order. Doesn't have insurance right now. ROS:  Review of Systems   Constitutional: Negative for activity change, appetite change, chills, fatigue and unexpected weight change. HENT: Negative for congestion, ear pain, hearing loss, nosebleeds, postnasal drip, sneezing, sore throat, trouble swallowing and voice change. Respiratory: Negative for cough, chest tightness, shortness of breath and wheezing. Cardiovascular: Negative for chest pain, palpitations and leg swelling. Gastrointestinal: Negative for abdominal distention, abdominal pain, blood in stool, constipation, diarrhea, nausea and vomiting. Genitourinary: Negative for difficulty urinating and dysuria. Musculoskeletal: Negative for arthralgias (+RA), back pain and neck pain. Skin: Negative for color change, pallor and rash. Neurological: Negative for dizziness, tremors, seizures, weakness, numbness and headaches. Hematological: Does not bruise/bleed easily. Psychiatric/Behavioral: Negative for agitation, dysphoric mood, hallucinations and sleep disturbance. The patient is not nervous/anxious. Follows with psych regularly      VITALS:  /78   Pulse 82   Temp 97.8 °F (36.6 °C) (Oral)   Ht 5' 6.5\" (1.689 m)   Wt 189 lb (85.7 kg)   LMP  (LMP Unknown)   SpO2 98%   BMI 30.05 kg/m²      PE:  Physical Exam  Vitals and nursing note reviewed. Constitutional:       General: She is not in acute distress. Appearance: Normal appearance. She is well-developed and normal weight. She is not diaphoretic. Neck:      Thyroid: No thyromegaly. Vascular: No carotid bruit or JVD. Trachea: No tracheal deviation. Cardiovascular:      Rate and Rhythm: Normal rate and regular rhythm.       Heart sounds: Normal heart sounds. No murmur heard. No friction rub. Pulmonary:      Effort: Pulmonary effort is normal. No respiratory distress. Breath sounds: Normal breath sounds. No stridor. No wheezing, rhonchi or rales. Chest:      Chest wall: No tenderness. Abdominal:      General: Abdomen is flat. There is no distension. Palpations: Abdomen is soft. Musculoskeletal:      Cervical back: Normal range of motion and neck supple. Right lower leg: No edema. Left lower leg: No edema. Lymphadenopathy:      Cervical: No cervical adenopathy. Skin:     General: Skin is warm and dry. Coloration: Skin is not pale. Findings: No erythema or rash. Neurological:      General: No focal deficit present. Mental Status: She is alert and oriented to person, place, and time. Motor: No weakness. Gait: Gait normal.   Psychiatric:         Mood and Affect: Mood normal.         Behavior: Behavior normal.         Thought Content:  Thought content normal.         Judgment: Judgment normal.        Lab Results   Component Value Date    CHOL 165 02/20/2021     Lab Results   Component Value Date    TRIG 119 02/20/2021     Lab Results   Component Value Date    HDL 41 02/20/2021     Lab Results   Component Value Date    LDLCALC 100 (H) 02/20/2021     Lab Results   Component Value Date    LABVLDL 24 02/20/2021     No results found for: [facility]  Lab Results   Component Value Date    LABA1C 6.4 02/20/2021     Lab Results   Component Value Date    .0 02/20/2021     Lab Results   Component Value Date    WBC 8.5 06/30/2021    HGB 12.6 06/30/2021    HCT 37.5 06/30/2021    MCV 82.8 06/30/2021     06/30/2021     Lab Results   Component Value Date     06/30/2021    K 4.2 06/30/2021     06/30/2021    CO2 22 06/30/2021    BUN 22 (H) 06/30/2021    CREATININE 1.0 06/30/2021    GLUCOSE 99 06/30/2021    CALCIUM 9.5 06/30/2021    PROT 7.6 06/30/2021    LABALBU 4.6 06/30/2021    BILITOT 0.5 06/30/2021    ALKPHOS 175 (H) 06/30/2021    AST 16 06/30/2021    ALT 13 06/30/2021    LABGLOM 58 (A) 06/30/2021    GFRAA >60 06/30/2021    AGRATIO 1.5 06/30/2021    GLOB 3.0 06/30/2021       ASSESSMENT/PLAN:  1. Essential hypertension  Stable on current regimen. Refills sent. - irbesartan (AVAPRO) 150 MG tablet; Take 1 tablet by mouth nightly  Dispense: 90 tablet; Refill: 1  - amLODIPine (NORVASC) 5 MG tablet; Take 1 tablet by mouth daily  Dispense: 90 tablet; Refill: 1    2. Prediabetes  Will continue with dietary efforts. 3. Rheumatoid arthritis in remission ([facility]  Continue per Dr Jeaneth Lobo. Has upcoming apt to discuss recent test results    4. Recurrent major depressive disorder, remission status unspecified (HCC)  Stable on current regimen. Continue per Dr Valerie Valenzuela for med management    5. Anxiety  Stable on current regimen. Continue per Dr Valerie Valenzuela for med management    HM:  Will complete mammogram once she has insurance. Has order and scheduling number      Return in about 6 months (around 1/26/2022) for blood pressure.      Electronically signed by SY Caicedo CNP on 7/26/2021 at 10:14 AM

## 2021-07-27 ENCOUNTER — TELEPHONE (OUTPATIENT)
Dept: PRIMARY CARE CLINIC | Age: 51
End: 2021-07-27

## 2021-07-27 NOTE — TELEPHONE ENCOUNTER
Spoke with pharmacist. Low potential interaction with norvasc and prograf. Patient has been on med for years. BP previously difficult to control. Stable on current regimen. Will continue meds as prescribed and monitor.

## 2021-07-27 NOTE — TELEPHONE ENCOUNTER
[facility] because there was a poss drug interaction with the amLODIPine (NORVASC) 5 MG tablet and also the tacrolimus (PROGRAF) 1 MG capsule

## 2021-09-14 DIAGNOSIS — M06.9 RHEUMATOID ARTHRITIS INVOLVING MULTIPLE SITES, UNSPECIFIED WHETHER RHEUMATOID FACTOR PRESENT (HCC): ICD-10-CM

## 2021-09-14 RX ORDER — TACROLIMUS 1 MG/1
CAPSULE ORAL
Qty: 60 CAPSULE | Refills: 0 | OUTPATIENT
Start: 2021-09-14

## 2021-09-14 NOTE — TELEPHONE ENCOUNTER
Called patient back states she isn't able to come in tomorrow. I will let her know if something else comes available sooner.

## 2021-09-14 NOTE — TELEPHONE ENCOUNTER
Called spoke with patient-she made appt on 10/20/21-was soonest available. States she is out of medication. States she will make it for appt.

## 2021-10-28 ENCOUNTER — TELEPHONE (OUTPATIENT)
Dept: PRIMARY CARE CLINIC | Age: 51
End: 2021-10-28

## 2021-10-29 DIAGNOSIS — I10 ESSENTIAL HYPERTENSION: ICD-10-CM

## 2021-10-29 NOTE — TELEPHONE ENCOUNTER
Medication:   Requested Prescriptions     Pending Prescriptions Disp Refills    irbesartan (AVAPRO) 150 MG tablet 30 tablet 5     Last Filled: 9.7.21    Last appt: 7/26/2021   Next appt: 10/29/2021    Last OARRS: No flowsheet data found.

## 2021-10-29 NOTE — TELEPHONE ENCOUNTER
Medication:   Requested Prescriptions     Pending Prescriptions Disp Refills    amLODIPine (NORVASC) 5 MG tablet 90 tablet 1     Sig: Take 1 tablet by mouth daily     Last Filled: 7.26.21    Last appt: 7/26/2021   Next appt: Visit date not found    Last OARRS: No flowsheet data found.

## 2021-10-31 RX ORDER — IRBESARTAN 150 MG/1
TABLET ORAL
Qty: 30 TABLET | Refills: 5 | Status: SHIPPED | OUTPATIENT
Start: 2021-10-31 | End: 2022-09-19 | Stop reason: SDUPTHER

## 2021-10-31 RX ORDER — AMLODIPINE BESYLATE 5 MG/1
5 TABLET ORAL DAILY
Qty: 90 TABLET | Refills: 1 | Status: SHIPPED | OUTPATIENT
Start: 2021-10-31 | End: 2022-09-26

## 2022-09-19 DIAGNOSIS — I10 ESSENTIAL HYPERTENSION: ICD-10-CM

## 2022-09-20 RX ORDER — IRBESARTAN 150 MG/1
TABLET ORAL
Qty: 30 TABLET | Refills: 5 | Status: SHIPPED | OUTPATIENT
Start: 2022-09-20 | End: 2022-11-04 | Stop reason: SDUPTHER

## 2022-09-20 NOTE — PROGRESS NOTES
ENCOUNTER DATE: 9/26/2022     NAME: Petar Manning   AGE: 47 y.o. GENDER: female   YOB: 1970    Patient Active Problem List   Diagnosis    Essential hypertension    [facility] ([facility]    Obesity (BMI 30.0-34. 9)    Dissociative amnesia with dissociative fugue ([facility]    History of total abdominal hysterectomy and bilateral salpingo-oophorectomy    Major depressive disorder    Other hyperlipidemia    Anxiety    Prediabetes      Allergies   Allergen Reactions    Aspirin Hives     Current Outpatient Medications on File Prior to Visit   Medication Sig Dispense Refill    irbesartan (AVAPRO) 150 MG tablet 1 tab PO QHS 30 tablet 5    FLUoxetine (PROZAC) 20 MG capsule Take 20 mg by mouth daily      topiramate (TOPAMAX) 25 MG tablet Take 1 tablet by mouth 2 times daily 60 tablet 0    tacrolimus (PROGRAF) 1 MG capsule Take 1 capsule by mouth 2 times daily 60 capsule 0     No current facility-administered medications on file prior to visit.         Past Medical History:   Diagnosis Date    Anxiety     Depression     Hypertension     PTSD (post-traumatic stress disorder)     RA (rheumatoid arthritis) ([facility]      Past Surgical History:   Procedure Laterality Date    CHOLECYSTECTOMY      HYSTERECTOMY (CERVIX STATUS UNKNOWN)        Family History   Problem Relation Age of Onset    Cancer Mother     Cancer Brother     Cancer Maternal Grandmother     Alcohol Abuse Maternal Grandfather      Social History     Tobacco Use    Smoking status: Never    Smokeless tobacco: Never   Substance Use Topics    Alcohol use: Not Currently      Patient Care Team:  SY Duron CNP as PCP - General (Family Nurse Practitioner)  SY Schmidt CNP as PCP - [facility] Empaneled Provider  Elizabeth Contreras MD (Psychiatry)  Gary Quinn MD as Consulting Physician (Rheumatology)    Chief Complaint   Patient presents with    Annual Exam     Has  had  cough  went  to urget  care last  Monday  congestion  covid negative        HPI:  Pedro Lyons is here for a physical and has an acute concern to discuss. Traveled to [place] to help her daughter through a high risk pregnancy. Baby and daughter are well. 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 HYPERTENSION:  irbesartan 150mg daily  Not taking norvasc. Stable  Checks at home and usually stable. Highest 150s, which is rare. RA:  Follows with Dr Lisa Jay  No longer On prograf. She ran out. States her RA is acting up. Needs to schedule apt with rheum    PSYCH:  seeing psych in [place] as well as therapist.   Sees psych every 2 mo. Following with therapist once per month. Mood has been stable. On topamax 25mg BID and prozac 20mg. Stopped Abilify    PREDIABETIC:   Last a1c 6.4    MAMMO:  Completed in [place] 10/14/2021. Will send in report  Last report: 11/13/18    GYN:  S/p hysterectomy    COLON CA SCREEN:  Cscope 10/11/2019    HM:  Due for shingles vaccine  Due for flu vaccine. Would like today  Due for covid boosters    COUGH:  Complains of cough x 1 wk. Seen at urgent care at [facility]. Covid negative, flu negative. Dx with URI. Given rx for amoxicillin, which she has completed. Still has sinus pressure  No drainage. Mild cough. Cough is dry. No fevers. No nasal spray. Causes her nosebleeds. Not taking otc decongestants. ROS:  Review of Systems   Constitutional:  Negative for activity change, appetite change, chills, fatigue and unexpected weight change. HENT:  Positive for sinus pressure and sinus pain. Negative for congestion, ear pain, hearing loss, nosebleeds, postnasal drip, sneezing, sore throat, trouble swallowing and voice change. Respiratory:  Positive for cough. Negative for chest tightness, shortness of breath and wheezing. Cardiovascular:  Negative for chest pain, palpitations and leg swelling.    Gastrointestinal:  Negative for abdominal distention, abdominal pain, blood in stool, constipation, diarrhea, nausea and vomiting. Genitourinary:  Negative for difficulty urinating, dysuria and menstrual problem. Musculoskeletal:  Negative for arthralgias (+RA), back pain and neck pain. Skin:  Negative for color change, pallor and rash. Neurological:  Negative for dizziness, tremors, seizures, weakness, numbness and headaches. Hematological:  Does not bruise/bleed easily. Psychiatric/Behavioral:  Negative for agitation, dysphoric mood, hallucinations and sleep disturbance. The patient is not nervous/anxious. Follows with psych regularly      VITALS:  /80   Pulse 90   Temp 98.5 °F (36.9 °C) (Oral)   Ht 5' 6.5\" (1.689 m)   Wt 198 lb 3.2 oz (89.9 kg)   LMP  (LMP Unknown)   SpO2 98%   BMI 31.51 kg/m²    BP Readings from Last 3 Encounters:   09/26/22 120/80   07/26/21 120/78   06/30/21 130/86     Wt Readings from Last 3 Encounters:   09/26/22 198 lb 3.2 oz (89.9 kg)   07/26/21 189 lb (85.7 kg)   06/30/21 188 lb (85.3 kg)       PE:  Physical Exam  Vitals and nursing note reviewed. Constitutional:       General: She is not in acute distress. Appearance: Normal appearance. She is well-developed and normal weight. She is not diaphoretic. HENT:      Head: Normocephalic and atraumatic. Right Ear: Tympanic membrane, ear canal and external ear normal.      Left Ear: Tympanic membrane, ear canal and external ear normal.      Nose:      Right Turbinates: Swollen. Left Turbinates: Swollen. Right Sinus: Maxillary sinus tenderness and frontal sinus tenderness present. Left Sinus: Maxillary sinus tenderness and frontal sinus tenderness present. Eyes:      Pupils: Pupils are equal, round, and reactive to light. Neck:      Thyroid: No thyromegaly. Vascular: No carotid bruit or JVD. Trachea: No tracheal deviation. Cardiovascular:      Rate and Rhythm: Normal rate and regular rhythm. Heart sounds: Normal heart sounds. No murmur heard. No friction rub. Pulmonary:      Effort: Pulmonary effort is normal. No respiratory distress. Breath sounds: Normal breath sounds. No stridor. No wheezing, rhonchi or rales. Abdominal:      General: Abdomen is flat. Bowel sounds are normal. There is no distension. Palpations: Abdomen is soft. There is no mass. Tenderness: There is no abdominal tenderness. There is no guarding or rebound. Hernia: No hernia is present. Musculoskeletal:      Cervical back: Normal range of motion and neck supple. Right lower leg: No edema. Left lower leg: No edema. Lymphadenopathy:      Cervical: No cervical adenopathy. Skin:     General: Skin is warm and dry. Coloration: Skin is not pale. Findings: No erythema or rash. Neurological:      General: No focal deficit present. Mental Status: She is alert and oriented to person, place, and time. Motor: No weakness. Gait: Gait normal.   Psychiatric:         Mood and Affect: Mood normal.         Behavior: Behavior normal.         Thought Content:  Thought content normal.         Judgment: Judgment normal.      Lab Results   Component Value Date    CHOL 165 02/20/2021     Lab Results   Component Value Date    TRIG 119 02/20/2021     Lab Results   Component Value Date    HDL 41 02/20/2021     Lab Results   Component Value Date    LDLCALC 100 (H) 02/20/2021     Lab Results   Component Value Date    LABVLDL 24 02/20/2021     No results found for: [facility]  Lab Results   Component Value Date    WBC 8.5 06/30/2021    HGB 12.6 06/30/2021    HCT 37.5 06/30/2021    MCV 82.8 06/30/2021     06/30/2021     Lab Results   Component Value Date     06/30/2021    K 4.2 06/30/2021     06/30/2021    CO2 22 06/30/2021    BUN 22 (H) 06/30/2021    CREATININE 1.0 06/30/2021    GLUCOSE 99 06/30/2021    CALCIUM 9.5 06/30/2021    PROT 7.6 06/30/2021    LABALBU 4.6 06/30/2021    BILITOT 0.5 06/30/2021    ALKPHOS 175 (H) 06/30/2021    AST 16 06/30/2021    ALT 13 06/30/2021    LABGLOM 58 (A) 06/30/2021    GFRAA >60 06/30/2021    AGRATIO 1.5 06/30/2021    GLOB 3.0 06/30/2021       ASSESSMENT/PLAN:  1. Annual physical exam  Will return to clinic for fasting labs. Orders entered  Mammo reportedly utd 10/2021. [facility] Repeat in 10 years  Due for shingles vaccine  Flu vaccine given today. Due for tetanus vaccine in the future. Due for covid booster  - CBC; Future  - Comprehensive Metabolic Panel; Future  - Lipid Panel; Future    2. Need for influenza vaccination  - Influenza, FLUCELVAX, (age 10 mo+), IM, Preservative Free, 0.5 mL    3. Essential hypertension  stable    4. Obesity (BMI 30.0-34.9)    5. Recurrent major depressive disorder, remission status unspecified (HCC)  Stable. Continue per psych for med management    6. Other hyperlipidemia  Stable. Check fasting labs    7. Prediabetes  - Hemoglobin A1C; Future    8. Rheumatoid arthritis in remission [facility])  Continue per rheumatology  Encouraged to schedule apt with Dr Dolores Dietz    9. Sinus pressure  Has completed anbx. Start on steroid pack. Doesn't tolerate nasal sprays. Will call if no improvement. - methylPREDNISolone (MEDROL, THALIA,) 4 MG tablet; Take by mouth per package instruction  Dispense: 1 kit; Refill: 0    Return in about 6 months (around 3/26/2023) for chronic visit, blood pressure.      Electronically signed by SY Lake CNP on 9/26/2022 at 12:39 PM

## 2022-09-20 NOTE — TELEPHONE ENCOUNTER
Medication:   Requested Prescriptions     Pending Prescriptions Disp Refills    irbesartan (AVAPRO) 150 MG tablet 30 tablet 5     Si tab PO QHS     Last Filled:  10.31.21    Last appt: 2021   Next appt: 2022    Last OARRS: No flowsheet data found.

## 2022-09-26 ENCOUNTER — OFFICE VISIT (OUTPATIENT)
Dept: PRIMARY CARE CLINIC | Age: 52
End: 2022-09-26
Payer: MEDICAID

## 2022-09-26 VITALS
HEIGHT: 67 IN | BODY MASS INDEX: 31.11 KG/M2 | WEIGHT: 198.2 LBS | TEMPERATURE: 98.5 F | SYSTOLIC BLOOD PRESSURE: 120 MMHG | OXYGEN SATURATION: 98 % | DIASTOLIC BLOOD PRESSURE: 80 MMHG | HEART RATE: 90 BPM

## 2022-09-26 DIAGNOSIS — Z00.00 ANNUAL PHYSICAL EXAM: Primary | ICD-10-CM

## 2022-09-26 DIAGNOSIS — M06.9 RHEUMATOID ARTHRITIS IN REMISSION (HCC): ICD-10-CM

## 2022-09-26 DIAGNOSIS — E78.49 OTHER HYPERLIPIDEMIA: ICD-10-CM

## 2022-09-26 DIAGNOSIS — E66.9 OBESITY (BMI 30.0-34.9): ICD-10-CM

## 2022-09-26 DIAGNOSIS — I10 ESSENTIAL HYPERTENSION: ICD-10-CM

## 2022-09-26 DIAGNOSIS — Z23 NEED FOR INFLUENZA VACCINATION: ICD-10-CM

## 2022-09-26 DIAGNOSIS — J34.89 SINUS PRESSURE: ICD-10-CM

## 2022-09-26 DIAGNOSIS — F33.9 RECURRENT MAJOR DEPRESSIVE DISORDER, REMISSION STATUS UNSPECIFIED (HCC): ICD-10-CM

## 2022-09-26 DIAGNOSIS — R73.03 PREDIABETES: ICD-10-CM

## 2022-09-26 PROCEDURE — 90471 IMMUNIZATION ADMIN: CPT | Performed by: NURSE PRACTITIONER

## 2022-09-26 PROCEDURE — 99212 OFFICE O/P EST SF 10 MIN: CPT | Performed by: NURSE PRACTITIONER

## 2022-09-26 PROCEDURE — 90674 CCIIV4 VAC NO PRSV 0.5 ML IM: CPT | Performed by: NURSE PRACTITIONER

## 2022-09-26 PROCEDURE — 99396 PREV VISIT EST AGE 40-64: CPT | Performed by: NURSE PRACTITIONER

## 2022-09-26 RX ORDER — METHYLPREDNISOLONE 4 MG/1
TABLET ORAL
Qty: 1 KIT | Refills: 0 | Status: SHIPPED | OUTPATIENT
Start: 2022-09-26

## 2022-09-26 SDOH — ECONOMIC STABILITY: FOOD INSECURITY: WITHIN THE PAST 12 MONTHS, YOU WORRIED THAT YOUR FOOD WOULD RUN OUT BEFORE YOU GOT MONEY TO BUY MORE.: NEVER TRUE

## 2022-09-26 SDOH — ECONOMIC STABILITY: FOOD INSECURITY: WITHIN THE PAST 12 MONTHS, THE FOOD YOU BOUGHT JUST DIDN'T LAST AND YOU DIDN'T HAVE MONEY TO GET MORE.: NEVER TRUE

## 2022-09-26 ASSESSMENT — ENCOUNTER SYMPTOMS
COLOR CHANGE: 0
BACK PAIN: 0
SINUS PAIN: 1
DIARRHEA: 0
SHORTNESS OF BREATH: 0
CONSTIPATION: 0
ABDOMINAL PAIN: 0
VOICE CHANGE: 0
COUGH: 1
VOMITING: 0
ABDOMINAL DISTENTION: 0
BLOOD IN STOOL: 0
TROUBLE SWALLOWING: 0
NAUSEA: 0
SORE THROAT: 0
SINUS PRESSURE: 1
CHEST TIGHTNESS: 0
WHEEZING: 0

## 2022-09-26 ASSESSMENT — PATIENT HEALTH QUESTIONNAIRE - PHQ9
6. FEELING BAD ABOUT YOURSELF - OR THAT YOU ARE A FAILURE OR HAVE LET YOURSELF OR YOUR FAMILY DOWN: 1
SUM OF ALL RESPONSES TO PHQ9 QUESTIONS 1 & 2: 2
2. FEELING DOWN, DEPRESSED OR HOPELESS: 1
9. THOUGHTS THAT YOU WOULD BE BETTER OFF DEAD, OR OF HURTING YOURSELF: 1
SUM OF ALL RESPONSES TO PHQ QUESTIONS 1-9: 9
8. MOVING OR SPEAKING SO SLOWLY THAT OTHER PEOPLE COULD HAVE NOTICED. OR THE OPPOSITE, BEING SO FIGETY OR RESTLESS THAT YOU HAVE BEEN MOVING AROUND A LOT MORE THAN USUAL: 1
SUM OF ALL RESPONSES TO PHQ QUESTIONS 1-9: 9
7. TROUBLE CONCENTRATING ON THINGS, SUCH AS READING THE NEWSPAPER OR WATCHING TELEVISION: 1
SUM OF ALL RESPONSES TO PHQ QUESTIONS 1-9: 9
1. LITTLE INTEREST OR PLEASURE IN DOING THINGS: 1
4. FEELING TIRED OR HAVING LITTLE ENERGY: 1
3. TROUBLE FALLING OR STAYING ASLEEP: 1
10. IF YOU CHECKED OFF ANY PROBLEMS, HOW DIFFICULT HAVE THESE PROBLEMS MADE IT FOR YOU TO DO YOUR WORK, TAKE CARE OF THINGS AT HOME, OR GET ALONG WITH OTHER PEOPLE: 1
5. POOR APPETITE OR OVEREATING: 1
SUM OF ALL RESPONSES TO PHQ QUESTIONS 1-9: 8

## 2022-09-26 ASSESSMENT — SOCIAL DETERMINANTS OF HEALTH (SDOH): HOW HARD IS IT FOR YOU TO PAY FOR THE VERY BASICS LIKE FOOD, HOUSING, MEDICAL CARE, AND HEATING?: NOT HARD AT ALL

## 2022-09-26 ASSESSMENT — ANXIETY QUESTIONNAIRES
3. WORRYING TOO MUCH ABOUT DIFFERENT THINGS: 1
1. FEELING NERVOUS, ANXIOUS, OR ON EDGE: 1
6. BECOMING EASILY ANNOYED OR IRRITABLE: 1
2. NOT BEING ABLE TO STOP OR CONTROL WORRYING: 1
7. FEELING AFRAID AS IF SOMETHING AWFUL MIGHT HAPPEN: 1
5. BEING SO RESTLESS THAT IT IS HARD TO SIT STILL: 1
4. TROUBLE RELAXING: 1
GAD7 TOTAL SCORE: 7
IF YOU CHECKED OFF ANY PROBLEMS ON THIS QUESTIONNAIRE, HOW DIFFICULT HAVE THESE PROBLEMS MADE IT FOR YOU TO DO YOUR WORK, TAKE CARE OF THINGS AT HOME, OR GET ALONG WITH OTHER PEOPLE: SOMEWHAT DIFFICULT

## 2022-09-26 ASSESSMENT — COLUMBIA-SUICIDE SEVERITY RATING SCALE - C-SSRS
1. WITHIN THE PAST MONTH, HAVE YOU WISHED YOU WERE DEAD OR WISHED YOU COULD GO TO SLEEP AND NOT WAKE UP?: NO
2. HAVE YOU ACTUALLY HAD ANY THOUGHTS OF KILLING YOURSELF?: NO
6. HAVE YOU EVER DONE ANYTHING, STARTED TO DO ANYTHING, OR PREPARED TO DO ANYTHING TO END YOUR LIFE?: NO

## 2022-11-04 DIAGNOSIS — I10 ESSENTIAL HYPERTENSION: ICD-10-CM

## 2022-11-04 RX ORDER — IRBESARTAN 150 MG/1
TABLET ORAL
Qty: 30 TABLET | Refills: 5 | Status: SHIPPED | OUTPATIENT
Start: 2022-11-04

## 2022-11-04 NOTE — TELEPHONE ENCOUNTER
Medication:   Requested Prescriptions     Pending Prescriptions Disp Refills    irbesartan (AVAPRO) 150 MG tablet 30 tablet 5     Si tab PO QHS     Last Filled:  22    Last appt: 2022   Next appt: 3/27/2023    Last OARRS: No flowsheet data found.

## 2022-12-08 DIAGNOSIS — I10 ESSENTIAL HYPERTENSION: ICD-10-CM

## 2022-12-08 NOTE — TELEPHONE ENCOUNTER
Medication:   Requested Prescriptions     Pending Prescriptions Disp Refills    irbesartan (AVAPRO) 150 MG tablet 30 tablet 5     Si tab PO QHS     Last Filled:  11.4.22    Last appt: 2022   Next appt: 3/27/2023    Last OARRS: No flowsheet data found.

## 2022-12-09 RX ORDER — IRBESARTAN 150 MG/1
TABLET ORAL
Qty: 30 TABLET | Refills: 5 | Status: SHIPPED | OUTPATIENT
Start: 2022-12-09

## 2023-01-11 DIAGNOSIS — I10 ESSENTIAL HYPERTENSION: ICD-10-CM

## 2023-01-11 RX ORDER — IRBESARTAN 150 MG/1
TABLET ORAL
Qty: 30 TABLET | Refills: 5 | Status: SHIPPED | OUTPATIENT
Start: 2023-01-11

## 2023-01-11 NOTE — TELEPHONE ENCOUNTER
Medication:   Requested Prescriptions     Pending Prescriptions Disp Refills    irbesartan (AVAPRO) 150 MG tablet 30 tablet 5     Si tab PO QHS       Last Filled:      Patient Phone Number: [phone] (home)     Last appt: 2022   Next appt: 4/3/2023    Last BMP:   Lab Results   Component Value Date/Time     2021 12:09 PM    K 4.2 2021 12:09 PM     2021 12:09 PM    CO2 22 2021 12:09 PM    ANIONGAP 15 2021 12:09 PM    GLUCOSE 99 2021 12:09 PM    BUN 22 2021 12:09 PM    CREATININE 1.0 2021 12:09 PM    LABGLOM 58 2021 12:09 PM    GFRAA >60 2021 12:09 PM    CALCIUM 9.5 2021 12:09 PM      Last CMP:   Lab Results   Component Value Date/Time     2021 12:09 PM    K 4.2 2021 12:09 PM     2021 12:09 PM    CO2 22 2021 12:09 PM    ANIONGAP 15 2021 12:09 PM    GLUCOSE 99 2021 12:09 PM    BUN 22 2021 12:09 PM    CREATININE 1.0 2021 12:09 PM    LABGLOM 58 2021 12:09 PM    GFRAA >60 2021 12:09 PM    PROT 7.6 2021 12:09 PM    LABALBU 4.6 2021 12:09 PM    AGRATIO 1.5 2021 12:09 PM    BILITOT 0.5 2021 12:09 PM    ALKPHOS 175 2021 12:09 PM    ALT 13 2021 12:09 PM    AST 16 2021 12:09 PM    GLOB 3.0 2021 12:09 PM     Last Renal Function:   Lab Results   Component Value Date/Time     2021 12:09 PM    K 4.2 2021 12:09 PM     2021 12:09 PM    CO2 22 2021 12:09 PM    GLUCOSE 99 2021 12:09 PM    BUN 22 2021 12:09 PM    CREATININE 1.0 2021 12:09 PM    LABALBU 4.6 2021 12:09 PM    CALCIUM 9.5 2021 12:09 PM    GFRAA >60 2021 12:09 PM       Last OARRS: No flowsheet data found.     Preferred Pharmacy:   [facility] [id number]  [address]  [address]  [facility] [address]  Phone: [phone] Fax: [phone] [address] [place] [address] [phone] - F [phone]  [facility] [address] [address]  Phone: [phone] Fax: [phone]    [facility] #[id number]- [address], [address]  Phone: [phone] Fax: [phone]

## 2023-01-27 NOTE — PROGRESS NOTES
Alex Macias MD  [facility]) Physicians - Rheumatology    [x] [facility]:  [facility]  [address] [facility] [] [facility]:  [address]   Office: [phone]  Fax: [phone]     RHEUMATOLOGY PROGRESS NOTE    ASSESSMENT/PLAN:  Nata Sheldon is a 47 y.o. female w/ prior Hx of RA and fibromyalgia. PMHx pertinent for HTN, pre-diabetes, HLD, PTSD, anxiety and severe depression. Current rheum meds:  Sarilumab 200 mg SC Q2wk: per pt started in 9/2020, ran out in 2021     Prior rheum meds:  HCQ: per pt took for 2 yrs, caused severe GI s/e and hair loss  MTX: per pt caused GI s/e and hair loss  Leflunomide: per pt took for 3 yrs, stopped working  SSZ: caused vomiting  Etanercept: per pt took for 3 yrs, controlled RA at first but stopped working, switched to Adalimumab  Adalimumab: per pt worked for 1 yr, stopped working  Tofacitinib: per pt took for 6 months, ineffective  Abatacept SC: took for 2.5 yrs, per pt worked for a period of time then developed reaction including throat tightness and \"welts\"  Tacrolimus 1 mg BID: per pt written for 3 mg BID but she can only afford 1 mg BID, started in 9/2020, ran out in 2021  Gabapentin: unclear why this was stopped    1. Rheumatoid arthritis involving multiple sites with positive rheumatoid factor ([facility] [address]  Assessment & Plan:  - initially diagnosed w/ seropositive RA and fibromyalgia by a rheumatologist in [place] at age 27. RF low positive. X-ray hands from 6/30/21 did not reveal any erosive changes. Previously did not respond to multiple DMARDs, most recently treated w/ Tacrolimus w/ Sarilumab which she ran out in 2021 and has been lost to f/u over the past 1.5 yrs. She had mild swan neck deformity of her fingers and synovitis in her R MCP and wrist joints on exam today. - resume Sarilumab now. Hold off on Tacrolimus. - start Prednisone taper now. Orders:  -     C-Reactive Protein;  Future  -     Sedimentation Rate; Future  -     predniSONE (DELTASONE) 5 MG tablet; Take 6 tablets by mouth every morning for 4 days, THEN 4 tablets every morning for 4 days, THEN 2 tablets every morning for 4 days, THEN 1 tablet every morning for 4 days. , Disp-52 tablet, R-0, DAWNormal  -     XR SHOULDER LEFT (MIN 2 VIEWS); Future  -     XR KNEE RIGHT (3 VIEWS); Future  -     XR KNEE LEFT (3 VIEWS); Future  -     sarilumab (KEVZARA) 200 MG/1. 14ML SOAJ injection; Inject 1.14 mLs into the skin every 14 days, Disp-6.84 mL, R-0Normal  2. Myalgia  Assessment & Plan:  - pt reported an 8 month Hx of generalized myalgias. Denied Hx of statin exposure. DDx includes fibromyalgia vs PMR.  - check muscle enzymes, CRP and ESR. - start Prednisone taper as above. - she will try Tizanidine 4 mg BID PRN. Orders:  -     C-Reactive Protein; Future  -     Sedimentation Rate; Future  -     CK; Future  -     Aldolase; Future  -     tiZANidine (ZANAFLEX) 4 MG tablet; Take 1 tablet by mouth 2 times daily as needed (muscle spasms), Disp-60 tablet, R-1Normal  3. Fibromyalgia  -     tiZANidine (ZANAFLEX) 4 MG tablet; Take 1 tablet by mouth 2 times daily as needed (muscle spasms), Disp-60 tablet, R-1Normal  4. Elevated alkaline phosphatase level  Assessment & Plan:  - ALP elevated on 6/30/21. Hx of vitamin D insufficiency. Check fractionated ALP isozymes. Orders:  -     Vitamin D 25 Hydroxy; Future  -     ALKALINE PHOSPHATASE, ISOENZYMES; Future  5. High risk medication use  -     ALT; Future  -     AST; Future  -     CBC with Auto Differential; Future  -     Creatinine; Future  -     Quantiferon, Incubated; Future     Return in about 6 weeks (around 3/14/2023) for lab result discussion and treatment plan, medication monitoring.     TIME SPENT TODAY:  I spent over 40 minutes of face-to-face time with the pt (including taking interval history and performing physical exam, review of medical records, independently interpreting results and communicating results to the pt/family/caregiver, counseling and educating the pt/family/caregiver on their disease status, high risk DMARD use and toxicity monitoring, implementation of treatment plan, coordination of care, documenting clinical information in the EMR) during today visit. At least 50% of this time was spent in counseling, explanation of diagnosis, planning of further management, and coordination of care. The risks and benefits of my recommendations, as well as other treatment options, benefits and side effects were discussed with the patient today. Questions were answered. NOTE: This report is transcribed by using voice recognition software dragon. Every effort is made to ensure accuracy; however, inadvertent computerized  transcription errors may be present. SUBJECTIVE:  Past medical/surgical history, medications and allergies are reviewed and updated as appropriate. Interval Hx:   Pt returns to the office today for Rheumatology f/u. She was last seen for initial consult 1.5 yrs ago. Pt states she has been lost to f/u d/t her daughter having a difficult pregnancy and she was temporarily out of a job. Pt reports worsening arthralgias in her hands and wrists after running out of Sarilumab and Tacrolimus some time in 2021. She reports prolonged morning stiffness. She reports a 6 month Hx of worsening pain in her L shoulder w/ limited ROM. She reports worsening knee pain. She reports a 8 month Hx of generalized myalgias, tracy in her b/l proximal arms and thighs. She feels her muscles have no strength d/t myalgias. She is unable to further describe the nature of her myalgia. She denies any Hx of statin exposure.      ROS:  Constitutional: denies fever/chills, unintentional weight loss, fatigue, malaise  Integumentary: denies photosensitivity, rash, patchy alopecia, or Sx of Raynaud's phenomenon  Eyes: denies dry eyes, redness or pain, visual disturbance, or floaters  Nose: denies nasal ulcers or recurrent sinusitis  Oral cavity: denies dry mouth or oral ulcers  Cardiovascular: denies Hx of pericarditis, CP, or palpitations  Respiratory: denies SOB, cough, hemoptysis, or pleurisy  Gastrointestinal: denies heart burn, dysphagia or esophageal dysmotility, denies change in bowel habits or Sx of IBD  Hematologic/Lymphatic: denies abnormal bruising or bleeding, denies Hx of blood clots or recurrent miscarriages, denies swollen LNs  Musculoskeletal:  refer to above HPI     Allergies   Allergen Reactions    Aspirin Hives       Past Medical History:        Diagnosis Date    Anxiety     Depression     Hypertension     PTSD (post-traumatic stress disorder)     RA (rheumatoid arthritis) ([facility] [address]        Past Surgical History:        Procedure Laterality Date    CHOLECYSTECTOMY      HYSTERECTOMY (CERVIX STATUS UNKNOWN)         Medications:    Current Outpatient Medications   Medication Sig Dispense Refill    predniSONE (DELTASONE) 5 MG tablet Take 6 tablets by mouth every morning for 4 days, THEN 4 tablets every morning for 4 days, THEN 2 tablets every morning for 4 days, THEN 1 tablet every morning for 4 days. 52 tablet 0    tiZANidine (ZANAFLEX) 4 MG tablet Take 1 tablet by mouth 2 times daily as needed (muscle spasms) 60 tablet 1    sarilumab (KEVZARA) 200 MG/1. 14ML SOAJ injection Inject 1.14 mLs into the skin every 14 days 6.84 mL 0    irbesartan (AVAPRO) 150 MG tablet 1 tab PO QHS 30 tablet 5    FLUoxetine (PROZAC) 20 MG capsule Take 20 mg by mouth daily      topiramate (TOPAMAX) 25 MG tablet Take 1 tablet by mouth 2 times daily 60 tablet 0    Cholecalciferol 1.25 MG (98744 UT) TABS Take 50,000 Units by mouth once a week For 12 weeks then take over-the-counter vitamin D3 2000 units daily. 12 tablet 0     No current facility-administered medications for this visit.         OBJECTIVE:  Physical Exam:  /68   Pulse 100   Wt 200 lb (90.7 kg)   LMP  (LMP Unknown)   BMI 31.80 kg/m²     GEN: AAOx3, in NAD, well-appearing  HEAD: normocephalic, atraumatic  EYES: no injection or icterus  NOSE: no nasal ulcers or nasal drainage  CVS: RRR  LUNGS: in no acute respiratory distress  MSK: there is diffuse myofascial tenderness w/ 18/18 tender points  Upper extremities:   Hands: there is swan neck deformity of some of the fingers, no tenosynovitis or dactylitis, R MCP joints w/ chronic synovial thickening and synovitis TTP, L MCP joints w/o synovitis TTP, b/l PIP joints w/ bony enlargement boggy and TTP, there is relative sparing of the DIP joints, full fist formation w/ fair  strength   Wrist: R wrist joint w/ synovial thickening mild synovitis TTP, L wrist joint w/o swelling NTTP, good flexion/extension   Elbow: no synovitis or bursitis, FROM   Shoulders: no pain or swelling or warmth on palpation, R shoulder abduction limited to 90 degrees pain w/ lift off test  Lower extremities:   Knees: no warmth or effusion present, medial and lateral joint lines TTP, good flexion/extension   Ankles: no synovitis, FROM, Achilles tendons w/o swelling or warmth NTTP   Feet: no toe swelling or pain or warmth on palpation w/ FROM, negative MTP squeeze test  NEURO: manual strength testing revealed 4/5 strength in b/l proximal upper extremities, hip flexors 4/5 b/l ?lack of effort   INTEGUMENT: no rash or psoriatic lesions, no petechiae, bruises, or palpable purpura, no patchy alopecia, no nail pitting or periungual changes, no clubbing or digital ulcers    DATA:  Labs:   I personally reviewed interval labs and discussed w/ the pt in detail which showed:    Lab Results   Component Value Date    WBC 8.8 01/31/2023    HGB 11.9 (L) 01/31/2023    HCT 38.0 01/31/2023    MCV 81.2 01/31/2023     01/31/2023    LYMPHOPCT 15.6 01/31/2023    RBC 4.67 01/31/2023    MCH 25.4 (L) 01/31/2023    MCHC 31.3 01/31/2023    RDW 14.5 01/31/2023     Lab Results   Component Value Date     01/31/2023    K 4.1 01/31/2023     01/31/2023    CO2 23 01/31/2023    BUN 16 01/31/2023    CREATININE 1.2 (H) 01/31/2023    GLUCOSE 113 (H) 01/31/2023    CALCIUM 9.8 01/31/2023    PROT 7.4 01/31/2023    LABALBU 4.5 01/31/2023    BILITOT 0.4 01/31/2023    ALKPHOS 149 (H) 01/31/2023    AST 13 (L) 01/31/2023    ALT 8 (L) 01/31/2023    LABGLOM 54 (A) 01/31/2023    GFRAA >60 06/30/2021    AGRATIO 1.6 01/31/2023    GLOB 3.0 06/30/2021     Lab Results   Component Value Date    COLORU Yellow 02/18/2021    CLARITYU Clear 02/18/2021    GLUCOSEU Negative 02/18/2021    BILIRUBINUR Negative 02/18/2021    KETUA Negative 02/18/2021    SPECGRAV 1.015 02/18/2021    BLOODU Negative 02/18/2021    PHUR 7.5 02/18/2021    PHUR 7.5 02/18/2021    PROTEINU Negative 02/18/2021    UROBILINOGEN 0.2 02/18/2021    NITRU Negative 02/18/2021    LEUKOCYTESUR Negative 02/18/2021    LABMICR Not Indicated 02/18/2021    URINETYPE NotGiven 02/18/2021     Lab Results   Component Value Date    VITD25 17.8 (L) 01/31/2023     No results found for: C3    No results found for: C4    No results found for: ANTIDSDNAIGG     Lab Results   Component Value Date    LABURIC 5.8 06/30/2021     Lab Results   Component Value Date    CRP 7.1 (H) 01/31/2023    CRP 11.4 (H) 06/30/2021     No results found for: SEDRATE    No results found for: CKTOTAL    RF 26, negative CCP (6/30/21)  Negative JOHANNA, SSA, SSB (6/30/21)  Negative hepatitis B and C serologies, Quantiferon TB (6/30/21)    Imaging:  I personally reviewed interval imaging and discussed w/ the pt in detail which included:    X-rays (6/30/21):   L hand:  Multiple views obtained demonstrate no sign of any acute fracture, dislocation or bony defect. There is no sign of a radiopaque foreign body or erosion   Normal appearing left hand. No erosion     Right hand:     3 views right hand obtained. FINDINGS:     There is normal alignment of the wrist articulation and metacarpal phalangeal joints and interphalangeal joints with some mild joint space loss at the interphalangeal joints.  No erosions     IMPRESSION:     No erosions or subluxation. No acute fracture or bony defect     I independently reviewed above X-rays, there is mild periarticular osteopenia, minimal joint space narrowing noted in the R carpal joints, OA changes in the PIP joints, no erosive changes or chondrocalcinosis.     CXR (6/30/21):  Lungs appear clear and hyperinflated.   Tortuous aorta appreciated   No bony defect.     Above results were discussed w/ the pt in detail during today's visit.

## 2023-01-31 ENCOUNTER — HOSPITAL ENCOUNTER (OUTPATIENT)
Dept: GENERAL RADIOLOGY | Age: 53
Discharge: HOME | End: 2023-01-31
Payer: COMMERCIAL

## 2023-01-31 ENCOUNTER — OFFICE VISIT (OUTPATIENT)
Dept: RHEUMATOLOGY | Age: 53
End: 2023-01-31
Payer: COMMERCIAL

## 2023-01-31 ENCOUNTER — TELEPHONE (OUTPATIENT)
Dept: RHEUMATOLOGY | Age: 53
End: 2023-01-31

## 2023-01-31 VITALS
DIASTOLIC BLOOD PRESSURE: 68 MMHG | WEIGHT: 200 LBS | SYSTOLIC BLOOD PRESSURE: 100 MMHG | BODY MASS INDEX: 31.8 KG/M2 | HEART RATE: 100 BPM

## 2023-01-31 DIAGNOSIS — E78.49 OTHER HYPERLIPIDEMIA: ICD-10-CM

## 2023-01-31 DIAGNOSIS — Z00.00 ANNUAL PHYSICAL EXAM: ICD-10-CM

## 2023-01-31 DIAGNOSIS — Z79.899 HIGH RISK MEDICATION USE: ICD-10-CM

## 2023-01-31 DIAGNOSIS — R74.8 ELEVATED ALKALINE PHOSPHATASE LEVEL: ICD-10-CM

## 2023-01-31 DIAGNOSIS — M79.7 FIBROMYALGIA: ICD-10-CM

## 2023-01-31 DIAGNOSIS — M79.10 MYALGIA: ICD-10-CM

## 2023-01-31 DIAGNOSIS — M05.79 RHEUMATOID ARTHRITIS INVOLVING MULTIPLE SITES WITH POSITIVE RHEUMATOID FACTOR (HCC): ICD-10-CM

## 2023-01-31 DIAGNOSIS — E55.9 VITAMIN D DEFICIENCY: Primary | ICD-10-CM

## 2023-01-31 DIAGNOSIS — E66.9 OBESITY (BMI 30.0-34.9): ICD-10-CM

## 2023-01-31 DIAGNOSIS — M05.79 RHEUMATOID ARTHRITIS INVOLVING MULTIPLE SITES WITH POSITIVE RHEUMATOID FACTOR (HCC): Primary | ICD-10-CM

## 2023-01-31 DIAGNOSIS — R73.03 PREDIABETES: ICD-10-CM

## 2023-01-31 LAB
A/G RATIO: 1.6 (ref 1.1–2.2)
ALBUMIN SERPL-MCNC: 4.5 G/DL (ref 3.4–5)
ALP BLD-CCNC: 149 U/L (ref 40–129)
ALT SERPL-CCNC: 8 U/L (ref 10–40)
ANION GAP SERPL CALCULATED.3IONS-SCNC: 13 MMOL/L (ref 3–16)
AST SERPL-CCNC: 13 U/L (ref 15–37)
BASOPHILS ABSOLUTE: 0 K/UL (ref 0–0.2)
BASOPHILS RELATIVE PERCENT: 0.2 %
BILIRUB SERPL-MCNC: 0.4 MG/DL (ref 0–1)
BUN BLDV-MCNC: 16 MG/DL (ref 7–20)
C-REACTIVE PROTEIN: 7.1 MG/L (ref 0–5.1)
CALCIUM SERPL-MCNC: 9.8 MG/DL (ref 8.3–10.6)
CHLORIDE BLD-SCNC: 104 MMOL/L (ref 99–110)
CHOLESTEROL, TOTAL: 168 MG/DL (ref 0–199)
CO2: 23 MMOL/L (ref 21–32)
CREAT SERPL-MCNC: 1.2 MG/DL (ref 0.6–1.1)
EOSINOPHILS ABSOLUTE: 0.1 K/UL (ref 0–0.6)
EOSINOPHILS RELATIVE PERCENT: 1.7 %
ESTIMATED AVERAGE GLUCOSE: 114 MG/DL
GFR SERPL CREATININE-BSD FRML MDRD: 54 ML/MIN/{1.73_M2}
GLUCOSE BLD-MCNC: 113 MG/DL (ref 70–99)
HBA1C MFR BLD: 5.6 %
HCT VFR BLD CALC: 38 % (ref 36–48)
HDLC SERPL-MCNC: 41 MG/DL (ref 40–60)
HEMOGLOBIN: 11.9 G/DL (ref 12–16)
LDL CHOLESTEROL CALCULATED: 97 MG/DL
LYMPHOCYTES ABSOLUTE: 1.4 K/UL (ref 1–5.1)
LYMPHOCYTES RELATIVE PERCENT: 15.6 %
MCH RBC QN AUTO: 25.4 PG (ref 26–34)
MCHC RBC AUTO-ENTMCNC: 31.3 G/DL (ref 31–36)
MCV RBC AUTO: 81.2 FL (ref 80–100)
MONOCYTES ABSOLUTE: 0.2 K/UL (ref 0–1.3)
MONOCYTES RELATIVE PERCENT: 2.8 %
NEUTROPHILS ABSOLUTE: 7.1 K/UL (ref 1.7–7.7)
NEUTROPHILS RELATIVE PERCENT: 79.7 %
PDW BLD-RTO: 14.5 % (ref 12.4–15.4)
PLATELET # BLD: 268 K/UL (ref 135–450)
PMV BLD AUTO: 8.7 FL (ref 5–10.5)
POTASSIUM SERPL-SCNC: 4.1 MMOL/L (ref 3.5–5.1)
RBC # BLD: 4.67 M/UL (ref 4–5.2)
SEDIMENTATION RATE, ERYTHROCYTE: 31 MM/HR (ref 0–30)
SODIUM BLD-SCNC: 140 MMOL/L (ref 136–145)
TOTAL CK: 66 U/L (ref 26–192)
TOTAL PROTEIN: 7.4 G/DL (ref 6.4–8.2)
TRIGL SERPL-MCNC: 150 MG/DL (ref 0–150)
VITAMIN D 25-HYDROXY: 17.8 NG/ML
VLDLC SERPL CALC-MCNC: 30 MG/DL
WBC # BLD: 8.8 K/UL (ref 4–11)

## 2023-01-31 PROCEDURE — 99215 OFFICE O/P EST HI 40 MIN: CPT | Performed by: INTERNAL MEDICINE

## 2023-01-31 PROCEDURE — 3078F DIAST BP <80 MM HG: CPT | Performed by: INTERNAL MEDICINE

## 2023-01-31 PROCEDURE — 73562 X-RAY EXAM OF KNEE 3: CPT

## 2023-01-31 PROCEDURE — 73030 X-RAY EXAM OF SHOULDER: CPT

## 2023-01-31 PROCEDURE — 3074F SYST BP LT 130 MM HG: CPT | Performed by: INTERNAL MEDICINE

## 2023-01-31 RX ORDER — PREDNISONE 1 MG/1
TABLET ORAL
Qty: 52 TABLET | Refills: 0 | Status: SHIPPED | OUTPATIENT
Start: 2023-01-31 | End: 2023-02-16

## 2023-01-31 RX ORDER — SARILUMAB 200 MG/1.14ML
200 INJECTION, SOLUTION SUBCUTANEOUS
Qty: 6.84 ML | Refills: 0 | Status: SHIPPED | OUTPATIENT
Start: 2023-01-31 | End: 2023-05-01

## 2023-01-31 RX ORDER — TIZANIDINE 4 MG/1
4 TABLET ORAL 2 TIMES DAILY PRN
Qty: 60 TABLET | Refills: 1 | Status: SHIPPED | OUTPATIENT
Start: 2023-01-31 | End: 2023-03-02

## 2023-01-31 RX ORDER — TACROLIMUS 1 MG/1
1 CAPSULE ORAL 2 TIMES DAILY
Qty: 60 CAPSULE | Refills: 0 | Status: CANCELLED | OUTPATIENT
Start: 2023-01-31 | End: 2023-03-02

## 2023-01-31 NOTE — ASSESSMENT & PLAN NOTE
- initially diagnosed w/ seropositive RA and fibromyalgia by a rheumatologist in [place] at age 27. RF low positive. X-ray hands from 6/30/21 did not reveal any erosive changes. Previously did not respond to multiple DMARDs, most recently treated w/ Tacrolimus w/ Sarilumab which she ran out in 2021 and has been lost to f/u over the past 1.5 yrs. She had mild swan neck deformity of her fingers and synovitis in her R MCP and wrist joints on exam today. - resume Sarilumab now. Hold off on Tacrolimus. - start Prednisone taper now.

## 2023-01-31 NOTE — ASSESSMENT & PLAN NOTE
- pt reported an 8 month Hx of generalized myalgias. Denied Hx of statin exposure. DDx includes fibromyalgia vs PMR.  - check muscle enzymes, CRP and ESR. - start Prednisone taper as above. - she will try Tizanidine 4 mg BID PRN.

## 2023-01-31 NOTE — RESULT ENCOUNTER NOTE
Vitamin D level is very low - this can cause fatigue and bone pains. Sent weekly supplement x 12 wks, start now.

## 2023-02-01 LAB — ALDOLASE: 2 U/L (ref 1.2–7.6)

## 2023-02-02 LAB
ALK PHOS OTHER CALC: 0 U/L
ALK PHOSPHATASE: 151 U/L (ref 40–120)
ALKALINE PHOSPHATASE BONE FRACTION: 62 U/L (ref 0–55)
ALKALINE PHOSPHATASE LIVER FRACTION: 89 U/L (ref 0–94)

## 2023-02-03 LAB
QUANTI TB GOLD PLUS: NEGATIVE
QUANTI TB1 MINUS NIL: 0 IU/ML (ref 0–0.34)
QUANTI TB2 MINUS NIL: 0 IU/ML (ref 0–0.34)
QUANTIFERON MITOGEN: 5.93 IU/ML
QUANTIFERON NIL: 0.02 IU/ML

## 2023-02-17 RX ORDER — SARILUMAB 200 MG/1.14ML
200 INJECTION, SOLUTION SUBCUTANEOUS
Qty: 6.84 ML | Refills: 0 | Status: SHIPPED | OUTPATIENT
Start: 2023-02-17 | End: 2023-05-18

## 2023-02-21 DIAGNOSIS — I10 ESSENTIAL HYPERTENSION: ICD-10-CM

## 2023-02-21 RX ORDER — IRBESARTAN 150 MG/1
TABLET ORAL
Qty: 30 TABLET | Refills: 5 | Status: CANCELLED | OUTPATIENT
Start: 2023-02-21

## 2023-03-10 NOTE — PROGRESS NOTES
Kelly Bazzi MD  [facility]) Physicians - Rheumatology    [x] [facility]:  [facility]  [address] [facility] [] [facility] 94:  [address], [address]   Office: [phone]  Fax: [phone]     RHEUMATOLOGY PROGRESS NOTE    ASSESSMENT/PLAN:  Ted Hayes is a 47 y.o. female w/ seropositive RA and fibromyalgia. PMHx pertinent for HTN, pre-diabetes, HLD, PTSD, anxiety and severe depression. Current rheum meds:  Sarilumab 200 mg SC Q2wk: per pt started in 9/2020, ran out in 2021. Resumed in 3/2023. Tizanidine 4 mg BID PRN  Cholecalciferol 50,000 IU wkly     Prior rheum meds:  HCQ: per pt took for 2 yrs, caused severe GI s/e and hair loss  MTX: per pt caused GI s/e and hair loss  Leflunomide: per pt took for 3 yrs, stopped working  SSZ: caused vomiting  Etanercept: per pt took for 3 yrs, controlled RA at first but stopped working, switched to Adalimumab  Adalimumab: per pt worked for 1 yr, stopped working  Tofacitinib: per pt took for 6 months, ineffective  Abatacept SC: took for 2.5 yrs, per pt worked for a period of time then developed reaction including throat tightness and \"welts\"  Tacrolimus 1 mg BID: per pt written for 3 mg BID but she can only afford 1 mg BID, started in 9/2020, ran out in 2021  Gabapentin: unclear why this was stopped    1. Rheumatoid arthritis involving multiple sites with positive rheumatoid factor ([facility]  Assessment & Plan:  - initially diagnosed w/ seropositive RA and fibromyalgia by a rheumatologist in [place] at age 27. RF low positive. X-ray hands from 6/30/21 did not reveal any erosive changes. Previously did not respond to multiple DMARDs, most recently treated w/ Tacrolimus w/ Sarilumab which she ran out in 2021 and has been lost to f/u over 1.5 yrs. She had mild swan neck deformity of her fingers and synovitis in her R MCP and wrist joints on exam in 1/2023. CRP mildly elevated on 1/31/23.  - synovitis improved on Prednisone taper. - resumed Sarilumab earlier this month, cont same dose. Hold off on Tacrolimus.  - we will see her back in 8 wks time, will escalate her immunotherapy if she cont to have active synovitis. Orders:  -     sarilumab (KEVZARA) 200 MG/1. 14ML SOAJ injection; Inject 1.14 mLs into the skin every 14 days, Disp-6.84 mL, R-0Normal  -     gabapentin (NEURONTIN) 300 MG capsule; Take 1 capsule by mouth nightly for 90 days. Intended supply: 90 days, Disp-90 capsule, R-0Normal  -     predniSONE (DELTASONE) 5 MG tablet; Take 6 tablets by mouth every morning for 4 days, THEN 4 tablets every morning for 4 days, THEN 2 tablets every morning for 4 days, THEN 1 tablet every morning for 4 days. , Disp-52 tablet, R-0, DAWNormal  2. Fibromyalgia  Assessment & Plan:  - no evidence of inflammatory myositis.  - good response to Tizanidine.  - she will resume Gabapentin 300 mg QHS and reduce Tizanidine to 4 mg daily PRN. - I encouraged the pt to start exercising. Orders:  -     tiZANidine (ZANAFLEX) 4 MG tablet; Take 1 tablet by mouth daily as needed (muscle spasms), Disp-60 tablet, R-0Normal  -     gabapentin (NEURONTIN) 300 MG capsule; Take 1 capsule by mouth nightly for 90 days. Intended supply: 90 days, Disp-90 capsule, R-0Normal  3. Vitamin D deficiency  Assessment & Plan:  - discussed her vitamin D deficiency. ALP mildly elevated. Cont weekly supplement. Orders:  -     Cholecalciferol 1.25 MG (94313 UT) TABS; Take 50,000 Units by mouth once a week For 12 weeks then take over-the-counter vitamin D3 2000 units daily. , Disp-12 tablet, R-0Normal  4. JOSÉ (acute kidney injury) [facility])  Assessment & Plan:  - discussed her mild JOSÉ on 1/31/23. Advised pt to stop taking Ibuprofen and avoid NSAIDs. Repeat renal function now.   5. High risk medication use  Assessment & Plan:  - discussed her immunocompromised state on Sarilumab and indications to hold it.  - discussed importance of checking labs to monitor for toxicity, pt agreed to see us at our new office the week of May 8th, we will plan on repeating her CBC w/ diff, CMP and fasting lipid panel at that time. Orders:  -     Creatinine; Future  -     AST; Future  -     ALT; Future     Return in about 8 weeks (around 5/9/2023) for lab result discussion and treatment plan, medication monitoring. The risks and benefits of my recommendations, as well as other treatment options, benefits and side effects were discussed with the patient today. Questions were answered. NOTE: This report is transcribed by using voice recognition software dragon. Every effort is made to ensure accuracy; however, inadvertent computerized  transcription errors may be present. SUBJECTIVE:  Past medical/surgical history, medications and allergies are reviewed and updated as appropriate. Interval Hx:   Pt reports good pain relief from Prednisone taper for her hand and wrist arthralgias. Joint swelling has improved. She tells me she took her first dose of Sarilumab earlier this month. Pt states she has been taking Ibuprofen sporadically for pain, she took 4 tablets last wk. Myalgias did not improve on Prednisone taper. Myalgias are migratory and \"random\" and brought on by stress - \"it feels like my bones are loose\". Denies muscle weakness. She recalls having pain in her L shoulder/L proximal arm recently. Tizanidine has improved her myalgias.     ROS:  Constitutional: denies fever/chills, unintentional weight loss, fatigue, malaise  Integumentary: denies photosensitivity, rash, patchy alopecia, or Sx of Raynaud's phenomenon  Eyes: denies dry eyes, redness or pain, visual disturbance, or floaters  Nose: denies nasal ulcers or recurrent sinusitis  Oral cavity: denies dry mouth or oral ulcers  Cardiovascular: denies Hx of pericarditis, CP, or palpitations  Respiratory: denies SOB, cough, hemoptysis, or pleurisy  Gastrointestinal: denies heart burn, dysphagia or esophageal dysmotility, denies change in bowel habits or Sx of IBD  Hematologic/Lymphatic: denies abnormal bruising or bleeding, denies Hx of blood clots or recurrent miscarriages, denies swollen LNs  Musculoskeletal:  refer to above HPI     Allergies   Allergen Reactions    Aspirin Hives       Past Medical History:        Diagnosis Date    Anxiety     Depression     Hypertension     PTSD (post-traumatic stress disorder)     RA (rheumatoid arthritis) ([facility]        Past Surgical History:        Procedure Laterality Date    CHOLECYSTECTOMY      HYSTERECTOMY (CERVIX STATUS UNKNOWN)         Medications:    Current Outpatient Medications   Medication Sig Dispense Refill    sarilumab (KEVZARA) 200 MG/1. 14ML SOAJ injection Inject 1.14 mLs into the skin every 14 days 6.84 mL 0    tiZANidine (ZANAFLEX) 4 MG tablet Take 1 tablet by mouth daily as needed (muscle spasms) 60 tablet 0    Cholecalciferol 1.25 MG (99019 UT) TABS Take 50,000 Units by mouth once a week For 12 weeks then take over-the-counter vitamin D3 2000 units daily. 12 tablet 0    gabapentin (NEURONTIN) 300 MG capsule Take 1 capsule by mouth nightly for 90 days. Intended supply: 90 days 90 capsule 0    predniSONE (DELTASONE) 5 MG tablet Take 6 tablets by mouth every morning for 4 days, THEN 4 tablets every morning for 4 days, THEN 2 tablets every morning for 4 days, THEN 1 tablet every morning for 4 days. 52 tablet 0    irbesartan (AVAPRO) 150 MG tablet 1 tab PO QHS 30 tablet 5    FLUoxetine (PROZAC) 20 MG capsule Take 20 mg by mouth daily      topiramate (TOPAMAX) 25 MG tablet Take 1 tablet by mouth 2 times daily 60 tablet 0     No current facility-administered medications for this visit.         OBJECTIVE:  Physical Exam:  /62   Pulse 100   Wt 200 lb (90.7 kg)   LMP  (LMP Unknown)   BMI 31.80 kg/m²     GEN: AAOx3, in NAD, well-appearing  HEAD: normocephalic, atraumatic  EYES: no injection or icterus  NOSE: no nasal ulcers or nasal drainage  CVS: RRR  LUNGS: in no acute respiratory distress  MSK: there is diffuse myofascial tenderness w/ 18/18 tender points  Upper extremities:   Hands: there is swan neck deformity of some of the fingers, no tenosynovitis or dactylitis, R MCP joints w/ chronic synovial thickening and improved synovitis TTP, L MCP joints w/o synovitis slightly TTP, b/l PIP joints w/ bony enlargement boggy and TTP, there is sparing of the DIP joints, full fist formation w/ fair  strength   Wrist: R wrist joint w/ synovial thickening and mild synovitis TTP, L wrist joint w/o swelling TTP, good flexion/extension   Elbow: no synovitis or bursitis, FROM   Shoulders: no pain or swelling or warmth on palpation, R shoulder abduction limited to 90 degrees pain w/ lift off test  Lower extremities:   Knees: no warmth or effusion present, medial and lateral joint lines TTP, good flexion/extension   Ankles: no synovitis, FROM, Achilles tendons w/o swelling or warmth NTTP   Feet: no toe swelling or pain or warmth on palpation w/ FROM, negative MTP squeeze test  INTEGUMENT: no rash or psoriatic lesions, no petechiae, bruises, or palpable purpura, no patchy alopecia, no nail pitting or periungual changes, no clubbing or digital ulcers    DATA:  Labs:   I personally reviewed interval labs and discussed w/ the pt in detail which showed:    Lab Results   Component Value Date    WBC 8.8 01/31/2023    HGB 11.9 (L) 01/31/2023    HCT 38.0 01/31/2023    MCV 81.2 01/31/2023     01/31/2023    LYMPHOPCT 15.6 01/31/2023    RBC 4.67 01/31/2023    MCH 25.4 (L) 01/31/2023    MCHC 31.3 01/31/2023    RDW 14.5 01/31/2023     Lab Results   Component Value Date     01/31/2023    K 4.1 01/31/2023     01/31/2023    CO2 23 01/31/2023    BUN 16 01/31/2023    CREATININE 1.2 (H) 01/31/2023    GLUCOSE 113 (H) 01/31/2023    CALCIUM 9.8 01/31/2023    PROT 7.4 01/31/2023    LABALBU 4.5 01/31/2023    BILITOT 0.4 01/31/2023    ALKPHOS 151 (H) 01/31/2023    ALKPHOS 149 (H) 01/31/2023    AST 13 (L) 01/31/2023    ALT 8 (L) 01/31/2023 LABGLOM 54 (A) 01/31/2023    GFRAA >60 06/30/2021    AGRATIO 1.6 01/31/2023    GLOB 3.0 06/30/2021 1/31/23: Alk Phosphatase 40 - 120 U/L 151 High     Alk Phos Bone Fract 0 - 55 U/L 62 High     Alk Phos Liver Fract 0 - 94 U/L 89      Lab Results   Component Value Date    COLORU Yellow 02/18/2021    CLARITYU Clear 02/18/2021    GLUCOSEU Negative 02/18/2021    BILIRUBINUR Negative 02/18/2021    KETUA Negative 02/18/2021    SPECGRAV 1.015 02/18/2021    BLOODU Negative 02/18/2021    PHUR 7.5 02/18/2021    PHUR 7.5 02/18/2021    PROTEINU Negative 02/18/2021    UROBILINOGEN 0.2 02/18/2021    NITRU Negative 02/18/2021    LEUKOCYTESUR Negative 02/18/2021    LABMICR Not Indicated 02/18/2021    URINETYPE NotGiven 02/18/2021     Lab Results   Component Value Date    VITD25 17.8 (L) 01/31/2023     No results found for: C3    No results found for: C4    No results found for: ANTIDSDNAIGG     Lab Results   Component Value Date    LABURIC 5.8 06/30/2021     Lab Results   Component Value Date    CRP 7.1 (H) 01/31/2023    CRP 11.4 (H) 06/30/2021     Lab Results   Component Value Date    SEDRATE 31 (H) 01/31/2023     Lab Results   Component Value Date    CKTOTAL 66 01/31/2023     Aldolase wnl (1/31/23)  RF 26, negative CCP (6/30/21)  Negative JOHANNA, SSA, SSB (6/30/21)  Negative hepatitis B surface Ag and core total Ab, hepatitis C Ab (6/30/21)  Negative Quantiferon TB Quantiferon TB    Imaging:  I personally reviewed interval imaging and discussed w/ the pt in detail which included:    X-rays (6/30/21):   L hand:  Multiple views obtained demonstrate no sign of any acute fracture, dislocation or bony defect. There is no sign of a radiopaque foreign body or erosion   Normal appearing left hand. No erosion     Right hand:     3 views right hand obtained.      FINDINGS:     There is normal alignment of the wrist articulation and metacarpal phalangeal joints and interphalangeal joints with some mild joint space loss at the interphalangeal joints. No erosions     IMPRESSION:     No erosions or subluxation. No acute fracture or bony defect     I independently reviewed above X-rays, there is mild periarticular osteopenia, minimal joint space narrowing noted in the R carpal joints, OA changes in the PIP joints, no erosive changes or chondrocalcinosis. X-rays (1/31/23): FINDINGS:     3 views of the left shoulder demonstrate normal glenohumeral and acromioclavicular joints without narrowing, osteophyte formation, or erosive change. Normal bone mineralization. No fractures or suspicious bony lesions. IMPRESSION:  No radiographic evidence of an inflammatory arthropathy or significant degenerative change. BILATERAL KNEES:     FINDINGS:   AP, lateral, and sunrise views of both the left and right knee demonstrate normal joint spaces without narrowing, erosive change, or osteophyte formation. No joint effusions. No fractures or suspicious bony lesions. Normal bone mineralization. IMPRESSION:     No radiographic evidence of an inflammatory arthropathy or significant degenerative change. Above results were discussed w/ the pt in detail during today's visit.

## 2023-03-14 ENCOUNTER — OFFICE VISIT (OUTPATIENT)
Dept: RHEUMATOLOGY | Age: 53
End: 2023-03-14
Payer: COMMERCIAL

## 2023-03-14 VITALS
BODY MASS INDEX: 31.8 KG/M2 | SYSTOLIC BLOOD PRESSURE: 116 MMHG | DIASTOLIC BLOOD PRESSURE: 62 MMHG | HEART RATE: 100 BPM | WEIGHT: 200 LBS

## 2023-03-14 DIAGNOSIS — N17.9 AKI (ACUTE KIDNEY INJURY) (HCC): ICD-10-CM

## 2023-03-14 DIAGNOSIS — M79.7 FIBROMYALGIA: ICD-10-CM

## 2023-03-14 DIAGNOSIS — Z79.899 HIGH RISK MEDICATION USE: ICD-10-CM

## 2023-03-14 DIAGNOSIS — E55.9 VITAMIN D DEFICIENCY: ICD-10-CM

## 2023-03-14 DIAGNOSIS — M05.79 RHEUMATOID ARTHRITIS INVOLVING MULTIPLE SITES WITH POSITIVE RHEUMATOID FACTOR (HCC): Primary | ICD-10-CM

## 2023-03-14 LAB
ALT SERPL-CCNC: 9 U/L (ref 10–40)
AST SERPL-CCNC: 14 U/L (ref 15–37)
CREAT SERPL-MCNC: 1.1 MG/DL (ref 0.6–1.1)
GFR SERPLBLD CREATININE-BSD FMLA CKD-EPI: 60 ML/MIN/{1.73_M2}

## 2023-03-14 PROCEDURE — 3074F SYST BP LT 130 MM HG: CPT | Performed by: INTERNAL MEDICINE

## 2023-03-14 PROCEDURE — 99214 OFFICE O/P EST MOD 30 MIN: CPT | Performed by: INTERNAL MEDICINE

## 2023-03-14 PROCEDURE — 3078F DIAST BP <80 MM HG: CPT | Performed by: INTERNAL MEDICINE

## 2023-03-14 RX ORDER — LORAZEPAM 0.5 MG/1
TABLET ORAL NIGHTLY PRN
COMMUNITY
Start: 2020-01-03 | End: 2023-03-14

## 2023-03-14 RX ORDER — PREDNISONE 1 MG/1
TABLET ORAL
Qty: 52 TABLET | Refills: 0 | Status: SHIPPED | OUTPATIENT
Start: 2023-03-14 | End: 2023-03-30

## 2023-03-14 RX ORDER — SARILUMAB 200 MG/1.14ML
200 INJECTION, SOLUTION SUBCUTANEOUS
Qty: 6.84 ML | Refills: 0 | Status: SHIPPED | OUTPATIENT
Start: 2023-03-14 | End: 2023-06-12

## 2023-03-14 RX ORDER — TIZANIDINE 4 MG/1
4 TABLET ORAL DAILY PRN
Qty: 60 TABLET | Refills: 0 | Status: SHIPPED | OUTPATIENT
Start: 2023-03-14 | End: 2023-06-12

## 2023-03-14 RX ORDER — GABAPENTIN 300 MG/1
300 CAPSULE ORAL NIGHTLY
Qty: 90 CAPSULE | Refills: 0 | Status: SHIPPED | OUTPATIENT
Start: 2023-03-14 | End: 2023-06-12

## 2023-03-14 NOTE — ASSESSMENT & PLAN NOTE
- no evidence of inflammatory myositis.  - good response to Tizanidine.  - she will resume Gabapentin 300 mg QHS and reduce Tizanidine to 4 mg daily PRN. - I encouraged the pt to start exercising.

## 2023-03-14 NOTE — ASSESSMENT & PLAN NOTE
- discussed her mild JOSÉ on 1/31/23. Advised pt to stop taking Ibuprofen and avoid NSAIDs. Repeat renal function now.

## 2023-03-14 NOTE — ASSESSMENT & PLAN NOTE
- discussed her immunocompromised state on Sarilumab and indications to hold it.  - discussed importance of checking labs to monitor for toxicity, pt agreed to see us at our new office the week of May 8th, we will plan on repeating her CBC w/ diff, CMP and fasting lipid panel at that time.

## 2023-03-14 NOTE — ASSESSMENT & PLAN NOTE
- initially diagnosed w/ seropositive RA and fibromyalgia by a rheumatologist in [place] at age 27. RF low positive. X-ray hands from 6/30/21 did not reveal any erosive changes. Previously did not respond to multiple DMARDs, most recently treated w/ Tacrolimus w/ Sarilumab which she ran out in 2021 and has been lost to f/u over 1.5 yrs. She had mild swan neck deformity of her fingers and synovitis in her R MCP and wrist joints on exam in 1/2023. CRP mildly elevated on 1/31/23.  - synovitis improved on Prednisone taper.   - resumed Sarilumab earlier this month, cont same dose. Hold off on Tacrolimus.  - we will see her back in 8 wks time, will escalate her immunotherapy if she cont to have active synovitis.

## 2023-03-22 NOTE — PROGRESS NOTES
Essential hypertension  Stable  Continue irbesartan 150mg daily  Will call when refill needed. 2. Rheumatoid arthritis involving multiple sites with positive rheumatoid factor (HCC)  Continue with Dr Enedina Short. Will be following rheum to [facility]. Apt scheduled in May    3. Skin lesion of left leg  Somewhat suspicious lesion. Refer to derm for further evaluation  - Donte Valencia MD, Dermatology, [facility]    4. Other hyperlipidemia  FLP checked in Jan.     5. Recurrent major depressive disorder, remission status unspecified ([facility]  Refer to new psych  No refills needed on psych meds at this time. I did advise patient if she is not able to schedule with a psychiatrist soon and will be running low on medications, I can send in refills temporarily until she is able to est with a new psych.    - External Referral to Psychiatry    6. Anxiety  - External Referral to Psychiatry    7. PTSD (post-traumatic stress disorder)  - External Referral to Psychiatry    8. Encounter for screening mammogram for malignant neoplasm of breast  - ZARA DIGITAL SCREEN W OR WO CAD BILATERAL; Future      Return in about 6 months (around 10/3/2023).      Electronically signed by SY Silva CNP on 4/3/2023 at 9:27 AM

## 2023-04-01 SDOH — ECONOMIC STABILITY: HOUSING INSECURITY
IN THE LAST 12 MONTHS, WAS THERE A TIME WHEN YOU DID NOT HAVE A STEADY PLACE TO SLEEP OR SLEPT IN A SHELTER (INCLUDING NOW)?: PATIENT REFUSED

## 2023-04-01 SDOH — ECONOMIC STABILITY: TRANSPORTATION INSECURITY
IN THE PAST 12 MONTHS, HAS LACK OF TRANSPORTATION KEPT YOU FROM MEETINGS, WORK, OR FROM GETTING THINGS NEEDED FOR DAILY LIVING?: YES

## 2023-04-01 SDOH — ECONOMIC STABILITY: FOOD INSECURITY: WITHIN THE PAST 12 MONTHS, THE FOOD YOU BOUGHT JUST DIDN'T LAST AND YOU DIDN'T HAVE MONEY TO GET MORE.: PATIENT DECLINED

## 2023-04-01 SDOH — ECONOMIC STABILITY: INCOME INSECURITY: HOW HARD IS IT FOR YOU TO PAY FOR THE VERY BASICS LIKE FOOD, HOUSING, MEDICAL CARE, AND HEATING?: HARD

## 2023-04-01 SDOH — ECONOMIC STABILITY: FOOD INSECURITY: WITHIN THE PAST 12 MONTHS, YOU WORRIED THAT YOUR FOOD WOULD RUN OUT BEFORE YOU GOT MONEY TO BUY MORE.: SOMETIMES TRUE

## 2023-04-03 ENCOUNTER — OFFICE VISIT (OUTPATIENT)
Dept: PRIMARY CARE CLINIC | Age: 53
End: 2023-04-03
Payer: COMMERCIAL

## 2023-04-03 VITALS
OXYGEN SATURATION: 98 % | HEIGHT: 67 IN | TEMPERATURE: 98.3 F | SYSTOLIC BLOOD PRESSURE: 122 MMHG | HEART RATE: 73 BPM | DIASTOLIC BLOOD PRESSURE: 86 MMHG | BODY MASS INDEX: 32.02 KG/M2 | WEIGHT: 204 LBS

## 2023-04-03 DIAGNOSIS — M05.79 RHEUMATOID ARTHRITIS INVOLVING MULTIPLE SITES WITH POSITIVE RHEUMATOID FACTOR (HCC): ICD-10-CM

## 2023-04-03 DIAGNOSIS — L98.9 SKIN LESION OF LEFT LEG: ICD-10-CM

## 2023-04-03 DIAGNOSIS — F41.9 ANXIETY: ICD-10-CM

## 2023-04-03 DIAGNOSIS — E78.49 OTHER HYPERLIPIDEMIA: ICD-10-CM

## 2023-04-03 DIAGNOSIS — I10 ESSENTIAL HYPERTENSION: Primary | ICD-10-CM

## 2023-04-03 DIAGNOSIS — Z12.31 ENCOUNTER FOR SCREENING MAMMOGRAM FOR MALIGNANT NEOPLASM OF BREAST: ICD-10-CM

## 2023-04-03 DIAGNOSIS — F33.9 RECURRENT MAJOR DEPRESSIVE DISORDER, REMISSION STATUS UNSPECIFIED (HCC): ICD-10-CM

## 2023-04-03 DIAGNOSIS — F43.10 PTSD (POST-TRAUMATIC STRESS DISORDER): ICD-10-CM

## 2023-04-03 PROCEDURE — 99214 OFFICE O/P EST MOD 30 MIN: CPT | Performed by: NURSE PRACTITIONER

## 2023-04-03 PROCEDURE — 3079F DIAST BP 80-89 MM HG: CPT | Performed by: NURSE PRACTITIONER

## 2023-04-03 PROCEDURE — 3074F SYST BP LT 130 MM HG: CPT | Performed by: NURSE PRACTITIONER

## 2023-04-03 ASSESSMENT — ENCOUNTER SYMPTOMS
CONSTIPATION: 0
TROUBLE SWALLOWING: 0
DIARRHEA: 0
BACK PAIN: 0
SINUS PAIN: 0
COUGH: 0
CHEST TIGHTNESS: 0
SORE THROAT: 0
WHEEZING: 0
SINUS PRESSURE: 0
SHORTNESS OF BREATH: 0
VOICE CHANGE: 0
VOMITING: 0
NAUSEA: 0
BLOOD IN STOOL: 0
ABDOMINAL DISTENTION: 0
ABDOMINAL PAIN: 0

## 2023-04-03 ASSESSMENT — PATIENT HEALTH QUESTIONNAIRE - PHQ9
1. LITTLE INTEREST OR PLEASURE IN DOING THINGS: 1
4. FEELING TIRED OR HAVING LITTLE ENERGY: 1
SUM OF ALL RESPONSES TO PHQ QUESTIONS 1-9: 6
SUM OF ALL RESPONSES TO PHQ QUESTIONS 1-9: 6
3. TROUBLE FALLING OR STAYING ASLEEP: 1
9. THOUGHTS THAT YOU WOULD BE BETTER OFF DEAD, OR OF HURTING YOURSELF: 0
7. TROUBLE CONCENTRATING ON THINGS, SUCH AS READING THE NEWSPAPER OR WATCHING TELEVISION: 1
5. POOR APPETITE OR OVEREATING: 0
10. IF YOU CHECKED OFF ANY PROBLEMS, HOW DIFFICULT HAVE THESE PROBLEMS MADE IT FOR YOU TO DO YOUR WORK, TAKE CARE OF THINGS AT HOME, OR GET ALONG WITH OTHER PEOPLE: 0
6. FEELING BAD ABOUT YOURSELF - OR THAT YOU ARE A FAILURE OR HAVE LET YOURSELF OR YOUR FAMILY DOWN: 1
SUM OF ALL RESPONSES TO PHQ QUESTIONS 1-9: 6
SUM OF ALL RESPONSES TO PHQ9 QUESTIONS 1 & 2: 2
2. FEELING DOWN, DEPRESSED OR HOPELESS: 1
8. MOVING OR SPEAKING SO SLOWLY THAT OTHER PEOPLE COULD HAVE NOTICED. OR THE OPPOSITE, BEING SO FIGETY OR RESTLESS THAT YOU HAVE BEEN MOVING AROUND A LOT MORE THAN USUAL: 0
SUM OF ALL RESPONSES TO PHQ QUESTIONS 1-9: 6

## 2023-04-03 ASSESSMENT — ANXIETY QUESTIONNAIRES
4. TROUBLE RELAXING: 2
3. WORRYING TOO MUCH ABOUT DIFFERENT THINGS: 2
6. BECOMING EASILY ANNOYED OR IRRITABLE: 1
2. NOT BEING ABLE TO STOP OR CONTROL WORRYING: 1
5. BEING SO RESTLESS THAT IT IS HARD TO SIT STILL: 1
IF YOU CHECKED OFF ANY PROBLEMS ON THIS QUESTIONNAIRE, HOW DIFFICULT HAVE THESE PROBLEMS MADE IT FOR YOU TO DO YOUR WORK, TAKE CARE OF THINGS AT HOME, OR GET ALONG WITH OTHER PEOPLE: SOMEWHAT DIFFICULT
7. FEELING AFRAID AS IF SOMETHING AWFUL MIGHT HAPPEN: 1
GAD7 TOTAL SCORE: 9
1. FEELING NERVOUS, ANXIOUS, OR ON EDGE: 1

## 2023-04-03 NOTE — PATIENT INSTRUCTIONS
Psychiatry    Psych[facility] Today  Resource to find providers  Www. psychologytoday. com    [facility] (Counseling and Psychiatry)   [phone]    [facility] (Counseling and Psychiatry)   [phone]  [address], [place]  Multiple other Locations. [facility] (Counseling and Psychiatry)  Main: [phone]  Locations:  [facility], [facility], [facility]

## 2023-09-22 ENCOUNTER — HOSPITAL ENCOUNTER (OUTPATIENT)
Dept: MAMMOGRAPHY | Age: 53
Discharge: HOME | End: 2023-09-22
Payer: COMMERCIAL

## 2023-09-22 VITALS — WEIGHT: 200 LBS | HEIGHT: 64 IN | BODY MASS INDEX: 34.15 KG/M2

## 2023-09-22 DIAGNOSIS — Z12.31 ENCOUNTER FOR SCREENING MAMMOGRAM FOR MALIGNANT NEOPLASM OF BREAST: ICD-10-CM

## 2023-09-22 PROCEDURE — 77063 BREAST TOMOSYNTHESIS BI: CPT

## 2023-09-29 NOTE — PROGRESS NOTES
ENCOUNTER DATE: 10/3/2023     NAME: Filippo Porter   AGE: 46 y.o. GENDER: female   YOB: 1970    Chief Complaint   Patient presents with    Hypertension       ASSESSMENT/PLAN:  1. Essential hypertension  Stable  - irbesartan (AVAPRO) 150 MG tablet; 1 tab PO QHS  Dispense: 90 tablet; Refill: 1    2. Other hyperlipidemia  Check fasting labs today. Proceed pending results  - CBC; Future  - Comprehensive Metabolic Panel; Future  - Lipid Panel; Future    3. Prediabetes  Check updated labs  - Hemoglobin A1C; Future    4. Rheumatoid arthritis involving multiple sites with positive rheumatoid factor ([address])  Continue per rheumatology. Notes reviewed    5. Need for influenza vaccination  - Influenza, FLUCELVAX, (age 10 mo+), IM, Preservative Free, 0.5 mL    6. Anxiety  Stable. Continue per psychiatry    7. Recurrent major depressive disorder, remission status unspecified (HCC)  Stable. Continue per psychiatry for med management    8. PTSD (post-traumatic stress disorder)  Stable. Continue per psychiatry for med management    9. Obesity (BMI 30.0-34. 9)      Return in about 6 months (around 4/3/2024) for chronic visit, blood pressure. HPI:   Patient is here for a chronic visit     HYPERTENSION:  irbesartan 150mg daily  Home BP readings stable. CHOL:  Last LDL 97    PREDIABETES  Last A1c 5.6 (6.4)     RHEUM  H/o RA and FM  Follows with Dr Jomar Castillo  Takes gabapentin 300 mg nightly, tizanidine 4mg bid prn, Kevzara inj (resumed in March) added MTX recently  Takes prednisone taper prn    PSYCH  H/o PTSD, anxiety, depression  Follows with psych in [place] with therapist  On topamax 25mg BID and prozac 20mg. MAMMO  Completed 9/22/23. Still waiting on report. They have called for previous imaging    ROS:  Review of Systems   Constitutional:  Negative for activity change, appetite change, chills, fatigue and unexpected weight change.    HENT:  Negative for congestion, ear pain, hearing loss,

## 2023-10-03 ENCOUNTER — OFFICE VISIT (OUTPATIENT)
Dept: PRIMARY CARE CLINIC | Age: 53
End: 2023-10-03
Payer: COMMERCIAL

## 2023-10-03 VITALS
SYSTOLIC BLOOD PRESSURE: 118 MMHG | HEART RATE: 98 BPM | WEIGHT: 205.03 LBS | DIASTOLIC BLOOD PRESSURE: 84 MMHG | TEMPERATURE: 97.3 F | BODY MASS INDEX: 35.19 KG/M2 | OXYGEN SATURATION: 97 %

## 2023-10-03 DIAGNOSIS — E66.9 OBESITY (BMI 30.0-34.9): ICD-10-CM

## 2023-10-03 DIAGNOSIS — F41.9 ANXIETY: ICD-10-CM

## 2023-10-03 DIAGNOSIS — E78.49 OTHER HYPERLIPIDEMIA: ICD-10-CM

## 2023-10-03 DIAGNOSIS — F33.9 RECURRENT MAJOR DEPRESSIVE DISORDER, REMISSION STATUS UNSPECIFIED (HCC): ICD-10-CM

## 2023-10-03 DIAGNOSIS — M05.79 RHEUMATOID ARTHRITIS INVOLVING MULTIPLE SITES WITH POSITIVE RHEUMATOID FACTOR (HCC): ICD-10-CM

## 2023-10-03 DIAGNOSIS — I10 ESSENTIAL HYPERTENSION: Primary | ICD-10-CM

## 2023-10-03 DIAGNOSIS — R73.03 PREDIABETES: ICD-10-CM

## 2023-10-03 DIAGNOSIS — Z23 NEED FOR INFLUENZA VACCINATION: ICD-10-CM

## 2023-10-03 DIAGNOSIS — F43.10 PTSD (POST-TRAUMATIC STRESS DISORDER): ICD-10-CM

## 2023-10-03 LAB
ALBUMIN SERPL-MCNC: 4.7 G/DL (ref 3.4–5)
ALBUMIN/GLOB SERPL: 1.9 {RATIO} (ref 1.1–2.2)
ALP SERPL-CCNC: 136 U/L (ref 40–129)
ALT SERPL-CCNC: 22 U/L (ref 10–40)
ANION GAP SERPL CALCULATED.3IONS-SCNC: 10 MMOL/L (ref 3–16)
AST SERPL-CCNC: 22 U/L (ref 15–37)
BILIRUB SERPL-MCNC: 0.5 MG/DL (ref 0–1)
BUN SERPL-MCNC: 30 MG/DL (ref 7–20)
CALCIUM SERPL-MCNC: 9.3 MG/DL (ref 8.3–10.6)
CHLORIDE SERPL-SCNC: 105 MMOL/L (ref 99–110)
CHOLEST SERPL-MCNC: 205 MG/DL (ref 0–199)
CO2 SERPL-SCNC: 26 MMOL/L (ref 21–32)
CREAT SERPL-MCNC: 1.3 MG/DL (ref 0.6–1.1)
DEPRECATED RDW RBC AUTO: 14 % (ref 12.4–15.4)
EST. AVERAGE GLUCOSE BLD GHB EST-MCNC: 105.4 MG/DL
GFR SERPLBLD CREATININE-BSD FMLA CKD-EPI: 49 ML/MIN/{1.73_M2}
GLUCOSE SERPL-MCNC: 105 MG/DL (ref 70–99)
HBA1C MFR BLD: 5.3 %
HCT VFR BLD AUTO: 39.7 % (ref 36–48)
HDLC SERPL-MCNC: 45 MG/DL (ref 40–60)
HGB BLD-MCNC: 13.2 G/DL (ref 12–16)
LDLC SERPL CALC-MCNC: 124 MG/DL
MCH RBC QN AUTO: 28 PG (ref 26–34)
MCHC RBC AUTO-ENTMCNC: 33.2 G/DL (ref 31–36)
MCV RBC AUTO: 84.4 FL (ref 80–100)
PLATELET # BLD AUTO: 246 K/UL (ref 135–450)
PMV BLD AUTO: 8.9 FL (ref 5–10.5)
POTASSIUM SERPL-SCNC: 4.8 MMOL/L (ref 3.5–5.1)
PROT SERPL-MCNC: 7.2 G/DL (ref 6.4–8.2)
RBC # BLD AUTO: 4.7 M/UL (ref 4–5.2)
SODIUM SERPL-SCNC: 141 MMOL/L (ref 136–145)
TRIGL SERPL-MCNC: 181 MG/DL (ref 0–150)
VLDLC SERPL CALC-MCNC: 36 MG/DL
WBC # BLD AUTO: 4.9 K/UL (ref 4–11)

## 2023-10-03 PROCEDURE — 3079F DIAST BP 80-89 MM HG: CPT | Performed by: NURSE PRACTITIONER

## 2023-10-03 PROCEDURE — 90471 IMMUNIZATION ADMIN: CPT | Performed by: NURSE PRACTITIONER

## 2023-10-03 PROCEDURE — 99214 OFFICE O/P EST MOD 30 MIN: CPT | Performed by: NURSE PRACTITIONER

## 2023-10-03 PROCEDURE — 3074F SYST BP LT 130 MM HG: CPT | Performed by: NURSE PRACTITIONER

## 2023-10-03 PROCEDURE — 90674 CCIIV4 VAC NO PRSV 0.5 ML IM: CPT | Performed by: NURSE PRACTITIONER

## 2023-10-03 RX ORDER — METHOTREXATE 25 MG/ML
INJECTION, SOLUTION INTRA-ARTERIAL; INTRAMUSCULAR; INTRAVENOUS
COMMUNITY
Start: 2023-08-24

## 2023-10-03 RX ORDER — IRBESARTAN 150 MG/1
TABLET ORAL
Qty: 90 TABLET | Refills: 1 | Status: SHIPPED | OUTPATIENT
Start: 2023-10-03

## 2023-10-03 ASSESSMENT — ENCOUNTER SYMPTOMS
NAUSEA: 0
SHORTNESS OF BREATH: 0
WHEEZING: 0
TROUBLE SWALLOWING: 0
COUGH: 0
SINUS PAIN: 0
BLOOD IN STOOL: 0
VOMITING: 0
ABDOMINAL DISTENTION: 0
DIARRHEA: 0
SINUS PRESSURE: 0
VOICE CHANGE: 0
CHEST TIGHTNESS: 0
CONSTIPATION: 0
SORE THROAT: 0
ABDOMINAL PAIN: 0
BACK PAIN: 0

## 2023-10-04 DIAGNOSIS — N28.9 ABNORMAL KIDNEY FUNCTION: Primary | ICD-10-CM

## 2024-03-22 NOTE — PROGRESS NOTES
ENCOUNTER DATE: 4/3/2024     NAME: Corrie Dougherty   AGE: 54 y.o.   GENDER: female   YOB: 1970    Chief Complaint   Patient presents with    Anxiety    Hyperlipidemia    Hypertension    Other     Prediabetes     Depression       ASSESSMENT/PLAN:  1. Essential hypertension  Stable  - irbesartan (AVAPRO) 150 MG tablet; 1 tab PO QHS  Dispense: 90 tablet; Refill: 1    2. Other hyperlipidemia  Reviewed labs from 10/2023  Patient will continue with dietary efforts.  Will add exercise into her routine  Will plan on checking fasting labs in the fall    3. Prediabetes  Stable    4. Rheumatoid arthritis involving multiple sites with positive rheumatoid factor (HCC)  Continue per rheumatology  Recent labs per Dr. Stone reviewed in care everywhere    5. Recurrent major depressive disorder, remission status unspecified (HCC)  Stable.  Continue per psychiatry    6. Anxiety  Stable.  Continue per psychiatry    7. Immunization due  Shingles vaccine given today.  Patient will return for second dose.  Discussed possible side effects  - Zoster, SHINGRIX, (18 yrs +), IM      Return in about 6 months (around 10/3/2024) for physical.     HPI:   Patient is here for a chronic visit    HYPERTENSION  On irbesartan 150mg daily  Doesn't check at home.     CHOL:  Last   Not currently on statin.      PREDIABETES  Last A1c 5.3 (5.6, 6.4)     RHEUM  H/o RA and FM  Follows with Dr Stone  Takes gabapentin 300 mg nightly, tizanidine 4mg bid prn, Kevzara inj, added MTX    PSYCH  H/o PTSD, anxiety, depression  Follows with psych in [place]  Follows with therapist  On topamax 25mg BID and prozac 20mg  Added buspar 20mg BID      Abnl kidney function, completed repeat labs yesterday.  Kidney function back to normal    ROS:  Review of Systems   Constitutional:  Negative for appetite change, chills, fatigue and unexpected weight change.   HENT:  Negative for congestion, ear pain, hearing loss, nosebleeds, postnasal drip, sinus  Patient/Caregiver provided printed discharge information.

## 2024-04-02 DIAGNOSIS — N28.9 ABNORMAL KIDNEY FUNCTION: ICD-10-CM

## 2024-04-02 ASSESSMENT — PATIENT HEALTH QUESTIONNAIRE - PHQ9
5. POOR APPETITE OR OVEREATING: NOT AT ALL
2. FEELING DOWN, DEPRESSED OR HOPELESS: SEVERAL DAYS
8. MOVING OR SPEAKING SO SLOWLY THAT OTHER PEOPLE COULD HAVE NOTICED. OR THE OPPOSITE - BEING SO FIDGETY OR RESTLESS THAT YOU HAVE BEEN MOVING AROUND A LOT MORE THAN USUAL: NOT AT ALL
SUM OF ALL RESPONSES TO PHQ QUESTIONS 1-9: 6
6. FEELING BAD ABOUT YOURSELF - OR THAT YOU ARE A FAILURE OR HAVE LET YOURSELF OR YOUR FAMILY DOWN: SEVERAL DAYS
SUM OF ALL RESPONSES TO PHQ9 QUESTIONS 1 & 2: 2
9. THOUGHTS THAT YOU WOULD BE BETTER OFF DEAD, OR OF HURTING YOURSELF: NOT AT ALL
10. IF YOU CHECKED OFF ANY PROBLEMS, HOW DIFFICULT HAVE THESE PROBLEMS MADE IT FOR YOU TO DO YOUR WORK, TAKE CARE OF THINGS AT HOME, OR GET ALONG WITH OTHER PEOPLE: NOT DIFFICULT AT ALL
2. FEELING DOWN, DEPRESSED OR HOPELESS: SEVERAL DAYS
4. FEELING TIRED OR HAVING LITTLE ENERGY: SEVERAL DAYS
1. LITTLE INTEREST OR PLEASURE IN DOING THINGS: SEVERAL DAYS
8. MOVING OR SPEAKING SO SLOWLY THAT OTHER PEOPLE COULD HAVE NOTICED. OR THE OPPOSITE, BEING SO FIGETY OR RESTLESS THAT YOU HAVE BEEN MOVING AROUND A LOT MORE THAN USUAL: NOT AT ALL
SUM OF ALL RESPONSES TO PHQ QUESTIONS 1-9: 6
7. TROUBLE CONCENTRATING ON THINGS, SUCH AS READING THE NEWSPAPER OR WATCHING TELEVISION: SEVERAL DAYS
SUM OF ALL RESPONSES TO PHQ QUESTIONS 1-9: 6
4. FEELING TIRED OR HAVING LITTLE ENERGY: SEVERAL DAYS
SUM OF ALL RESPONSES TO PHQ QUESTIONS 1-9: 6
6. FEELING BAD ABOUT YOURSELF - OR THAT YOU ARE A FAILURE OR HAVE LET YOURSELF OR YOUR FAMILY DOWN: SEVERAL DAYS
SUM OF ALL RESPONSES TO PHQ QUESTIONS 1-9: 6
5. POOR APPETITE OR OVEREATING: NOT AT ALL
7. TROUBLE CONCENTRATING ON THINGS, SUCH AS READING THE NEWSPAPER OR WATCHING TELEVISION: SEVERAL DAYS
1. LITTLE INTEREST OR PLEASURE IN DOING THINGS: SEVERAL DAYS
3. TROUBLE FALLING OR STAYING ASLEEP: SEVERAL DAYS
3. TROUBLE FALLING OR STAYING ASLEEP: SEVERAL DAYS
9. THOUGHTS THAT YOU WOULD BE BETTER OFF DEAD, OR OF HURTING YOURSELF: NOT AT ALL
10. IF YOU CHECKED OFF ANY PROBLEMS, HOW DIFFICULT HAVE THESE PROBLEMS MADE IT FOR YOU TO DO YOUR WORK, TAKE CARE OF THINGS AT HOME, OR GET ALONG WITH OTHER PEOPLE: NOT DIFFICULT AT ALL

## 2024-04-03 ENCOUNTER — OFFICE VISIT (OUTPATIENT)
Dept: PRIMARY CARE CLINIC | Age: 54
End: 2024-04-03
Payer: COMMERCIAL

## 2024-04-03 VITALS
OXYGEN SATURATION: 95 % | BODY MASS INDEX: 35.19 KG/M2 | WEIGHT: 205 LBS | DIASTOLIC BLOOD PRESSURE: 87 MMHG | SYSTOLIC BLOOD PRESSURE: 125 MMHG | RESPIRATION RATE: 16 BRPM | TEMPERATURE: 97.6 F | HEART RATE: 78 BPM

## 2024-04-03 DIAGNOSIS — F33.9 RECURRENT MAJOR DEPRESSIVE DISORDER, REMISSION STATUS UNSPECIFIED (HCC): ICD-10-CM

## 2024-04-03 DIAGNOSIS — R73.03 PREDIABETES: ICD-10-CM

## 2024-04-03 DIAGNOSIS — I10 ESSENTIAL HYPERTENSION: Primary | ICD-10-CM

## 2024-04-03 DIAGNOSIS — Z23 IMMUNIZATION DUE: ICD-10-CM

## 2024-04-03 DIAGNOSIS — F41.9 ANXIETY: ICD-10-CM

## 2024-04-03 DIAGNOSIS — E78.49 OTHER HYPERLIPIDEMIA: ICD-10-CM

## 2024-04-03 DIAGNOSIS — M05.79 RHEUMATOID ARTHRITIS INVOLVING MULTIPLE SITES WITH POSITIVE RHEUMATOID FACTOR (HCC): ICD-10-CM

## 2024-04-03 LAB
ANION GAP SERPL CALCULATED.3IONS-SCNC: 11 MMOL/L (ref 3–16)
BUN SERPL-MCNC: 17 MG/DL (ref 7–20)
CALCIUM SERPL-MCNC: 9.4 MG/DL (ref 8.3–10.6)
CHLORIDE SERPL-SCNC: 105 MMOL/L (ref 99–110)
CO2 SERPL-SCNC: 24 MMOL/L (ref 21–32)
CREAT SERPL-MCNC: 1 MG/DL (ref 0.6–1.1)
GFR SERPLBLD CREATININE-BSD FMLA CKD-EPI: 67 ML/MIN/{1.73_M2}
GLUCOSE SERPL-MCNC: 96 MG/DL (ref 70–99)
POTASSIUM SERPL-SCNC: 4.7 MMOL/L (ref 3.5–5.1)
SODIUM SERPL-SCNC: 140 MMOL/L (ref 136–145)

## 2024-04-03 PROCEDURE — 3017F COLORECTAL CA SCREEN DOC REV: CPT | Performed by: NURSE PRACTITIONER

## 2024-04-03 PROCEDURE — 3079F DIAST BP 80-89 MM HG: CPT | Performed by: NURSE PRACTITIONER

## 2024-04-03 PROCEDURE — 1036F TOBACCO NON-USER: CPT | Performed by: NURSE PRACTITIONER

## 2024-04-03 PROCEDURE — G8417 CALC BMI ABV UP PARAM F/U: HCPCS | Performed by: NURSE PRACTITIONER

## 2024-04-03 PROCEDURE — 90750 HZV VACC RECOMBINANT IM: CPT | Performed by: NURSE PRACTITIONER

## 2024-04-03 PROCEDURE — 3074F SYST BP LT 130 MM HG: CPT | Performed by: NURSE PRACTITIONER

## 2024-04-03 PROCEDURE — G8427 DOCREV CUR MEDS BY ELIG CLIN: HCPCS | Performed by: NURSE PRACTITIONER

## 2024-04-03 PROCEDURE — 99214 OFFICE O/P EST MOD 30 MIN: CPT | Performed by: NURSE PRACTITIONER

## 2024-04-03 PROCEDURE — 90471 IMMUNIZATION ADMIN: CPT | Performed by: NURSE PRACTITIONER

## 2024-04-03 RX ORDER — IRBESARTAN 150 MG/1
TABLET ORAL
Qty: 90 TABLET | Refills: 1 | Status: SHIPPED | OUTPATIENT
Start: 2024-04-03

## 2024-04-03 RX ORDER — SARILUMAB 150 MG/1.14ML
150 INJECTION, SOLUTION SUBCUTANEOUS
COMMUNITY

## 2024-04-03 RX ORDER — BUSPIRONE HYDROCHLORIDE 10 MG/1
20 TABLET ORAL
COMMUNITY
Start: 2023-10-11

## 2024-04-03 SDOH — ECONOMIC STABILITY: FOOD INSECURITY: WITHIN THE PAST 12 MONTHS, THE FOOD YOU BOUGHT JUST DIDN'T LAST AND YOU DIDN'T HAVE MONEY TO GET MORE.: SOMETIMES TRUE

## 2024-04-03 SDOH — ECONOMIC STABILITY: FOOD INSECURITY: WITHIN THE PAST 12 MONTHS, YOU WORRIED THAT YOUR FOOD WOULD RUN OUT BEFORE YOU GOT MONEY TO BUY MORE.: NEVER TRUE

## 2024-04-03 SDOH — ECONOMIC STABILITY: INCOME INSECURITY: HOW HARD IS IT FOR YOU TO PAY FOR THE VERY BASICS LIKE FOOD, HOUSING, MEDICAL CARE, AND HEATING?: SOMEWHAT HARD

## 2024-04-03 ASSESSMENT — ENCOUNTER SYMPTOMS
SORE THROAT: 0
BLOOD IN STOOL: 0
NAUSEA: 0
SHORTNESS OF BREATH: 0
BACK PAIN: 0
CHEST TIGHTNESS: 0
CONSTIPATION: 0
VOMITING: 0
DIARRHEA: 0
COUGH: 0
ABDOMINAL DISTENTION: 0
WHEEZING: 0
SINUS PAIN: 0
SINUS PRESSURE: 0
ABDOMINAL PAIN: 0

## 2024-07-17 PROBLEM — N18.31 CKD STAGE 3A, GFR 45-59 ML/MIN (HCC): Status: ACTIVE | Noted: 2024-07-17

## 2024-09-18 PROBLEM — N18.2 CKD (CHRONIC KIDNEY DISEASE), STAGE II: Status: ACTIVE | Noted: 2024-07-17

## 2024-09-23 ENCOUNTER — TELEPHONE (OUTPATIENT)
Dept: MAMMOGRAPHY | Age: 54
End: 2024-09-23

## 2024-09-23 ENCOUNTER — HOSPITAL ENCOUNTER (OUTPATIENT)
Dept: MAMMOGRAPHY | Age: 54
Discharge: HOME | End: 2024-09-23
Payer: COMMERCIAL

## 2024-09-23 VITALS — WEIGHT: 212 LBS | HEIGHT: 64 IN | BODY MASS INDEX: 36.19 KG/M2

## 2024-09-23 DIAGNOSIS — R92.8 ABNORMAL MAMMOGRAM OF BOTH BREASTS: Primary | ICD-10-CM

## 2024-09-23 DIAGNOSIS — Z12.31 ENCOUNTER FOR SCREENING MAMMOGRAM FOR BREAST CANCER: ICD-10-CM

## 2024-09-23 PROCEDURE — 77063 BREAST TOMOSYNTHESIS BI: CPT

## 2024-10-01 ENCOUNTER — HOSPITAL ENCOUNTER (OUTPATIENT)
Dept: WOMENS IMAGING | Age: 54
Discharge: HOME | End: 2024-10-01
Payer: COMMERCIAL

## 2024-10-01 DIAGNOSIS — R92.8 ABNORMAL MAMMOGRAM OF BOTH BREASTS: ICD-10-CM

## 2024-10-01 DIAGNOSIS — R92.8 ABNORMAL MAMMOGRAM: ICD-10-CM

## 2024-10-01 PROCEDURE — 76642 ULTRASOUND BREAST LIMITED: CPT

## 2024-10-01 PROCEDURE — G0279 TOMOSYNTHESIS, MAMMO: HCPCS

## 2024-10-03 NOTE — PROGRESS NOTES
ENCOUNTER DATE: 10/7/2024     NAME: Corrie Dougherty   AGE: 54 y.o.   GENDER: female   YOB: 1970    Chief Complaint   Patient presents with    Annual Exam        ASSESSMENT/PLAN:  1. Annual physical exam  Check fasting labs today.  Proceed pending results  C-scope up-to-date 2019.  Repeat in 10 years  S/p hysterectomy  Due for Shingrix dose #2.  Will obtain at a later date  Discussed pneumonia vaccine.  Since patient is on immunosuppressants for her RA, will give pneumonia vaccine per rheumatology recommendations.  Flu vaccine given today  Due for tetanus.  Will obtain at a later day  - Comprehensive Metabolic Panel; Future  - Lipid Panel; Future  - Hemoglobin A1C; Future  - Pneumococcal, PCV20, PREVNAR 20, (age 6w+), IM, PF    2. Needs flu shot  - Influenza, FLUCELVAX Trivalent, (age 6 mo+) IM, Preservative Free, 0.5mL    3. Immunization due  - Pneumococcal, PCV20, PREVNAR 20, (age 6w+), IM, PF    4. Essential hypertension  Stable  Encouraged to check BP at home.  Call if high readings  - Comprehensive Metabolic Panel; Future  - Lipid Panel; Future  - irbesartan (AVAPRO) 150 MG tablet; 1 tab PO QHS  Dispense: 90 tablet; Refill: 1    5. Other hyperlipidemia  Check fasting labs today  - Comprehensive Metabolic Panel; Future  - Lipid Panel; Future    6. Prediabetes  - Hemoglobin A1C; Future    7. Rheumatoid arthritis involving multiple sites with positive rheumatoid factor (HCC)  Stable.  Continue per rheumatology    8. Recurrent major depressive disorder, remission status unspecified (HCC)  Stable.  Continue per psychiatry    9. Anxiety  Able.  Continue per psychiatry    10.  CKD stage II  Continue per nephrology      Return in about 6 months (around 4/7/2025) for chronic visit, blood pressure.     HPI:  Corrie Dougherty is here for a physical.    GYN  S/p hysterectomy     COLON CA SCREEN:  Cscope 10/11/2019. Repeat in 10 years     MAMMO  Screening completed 9/23/24, abnormalities of bilateral breasts.

## 2024-10-07 ENCOUNTER — OFFICE VISIT (OUTPATIENT)
Dept: PRIMARY CARE CLINIC | Age: 54
End: 2024-10-07
Payer: COMMERCIAL

## 2024-10-07 VITALS
BODY MASS INDEX: 36.22 KG/M2 | SYSTOLIC BLOOD PRESSURE: 137 MMHG | RESPIRATION RATE: 16 BRPM | TEMPERATURE: 97.2 F | OXYGEN SATURATION: 97 % | WEIGHT: 211 LBS | HEART RATE: 80 BPM | DIASTOLIC BLOOD PRESSURE: 82 MMHG

## 2024-10-07 DIAGNOSIS — R73.03 PREDIABETES: ICD-10-CM

## 2024-10-07 DIAGNOSIS — Z23 NEEDS FLU SHOT: ICD-10-CM

## 2024-10-07 DIAGNOSIS — I10 ESSENTIAL HYPERTENSION: ICD-10-CM

## 2024-10-07 DIAGNOSIS — Z00.00 ANNUAL PHYSICAL EXAM: Primary | ICD-10-CM

## 2024-10-07 DIAGNOSIS — E78.49 OTHER HYPERLIPIDEMIA: ICD-10-CM

## 2024-10-07 DIAGNOSIS — F41.9 ANXIETY: ICD-10-CM

## 2024-10-07 DIAGNOSIS — F33.9 RECURRENT MAJOR DEPRESSIVE DISORDER, REMISSION STATUS UNSPECIFIED (HCC): ICD-10-CM

## 2024-10-07 DIAGNOSIS — N18.2 CKD (CHRONIC KIDNEY DISEASE), STAGE II: ICD-10-CM

## 2024-10-07 DIAGNOSIS — Z00.00 ANNUAL PHYSICAL EXAM: ICD-10-CM

## 2024-10-07 DIAGNOSIS — M05.79 RHEUMATOID ARTHRITIS INVOLVING MULTIPLE SITES WITH POSITIVE RHEUMATOID FACTOR (HCC): ICD-10-CM

## 2024-10-07 DIAGNOSIS — Z23 IMMUNIZATION DUE: ICD-10-CM

## 2024-10-07 LAB
ALBUMIN SERPL-MCNC: 4.4 G/DL (ref 3.4–5)
ALBUMIN/GLOB SERPL: 1.9 {RATIO} (ref 1.1–2.2)
ALP SERPL-CCNC: 132 U/L (ref 40–129)
ALT SERPL-CCNC: 56 U/L (ref 10–40)
ANION GAP SERPL CALCULATED.3IONS-SCNC: 8 MMOL/L (ref 3–16)
AST SERPL-CCNC: 38 U/L (ref 15–37)
BILIRUB SERPL-MCNC: 0.7 MG/DL (ref 0–1)
BUN SERPL-MCNC: 15 MG/DL (ref 7–20)
CALCIUM SERPL-MCNC: 9.6 MG/DL (ref 8.3–10.6)
CHLORIDE SERPL-SCNC: 104 MMOL/L (ref 99–110)
CHOLEST SERPL-MCNC: 195 MG/DL (ref 0–199)
CO2 SERPL-SCNC: 26 MMOL/L (ref 21–32)
CREAT SERPL-MCNC: 1.1 MG/DL (ref 0.6–1.1)
GFR SERPLBLD CREATININE-BSD FMLA CKD-EPI: 59 ML/MIN/{1.73_M2}
GLUCOSE SERPL-MCNC: 97 MG/DL (ref 70–99)
HDLC SERPL-MCNC: 43 MG/DL (ref 40–60)
LDLC SERPL CALC-MCNC: 121 MG/DL
POTASSIUM SERPL-SCNC: 4.5 MMOL/L (ref 3.5–5.1)
PROT SERPL-MCNC: 6.7 G/DL (ref 6.4–8.2)
SODIUM SERPL-SCNC: 138 MMOL/L (ref 136–145)
TRIGL SERPL-MCNC: 154 MG/DL (ref 0–150)
VLDLC SERPL CALC-MCNC: 31 MG/DL

## 2024-10-07 PROCEDURE — 3075F SYST BP GE 130 - 139MM HG: CPT | Performed by: NURSE PRACTITIONER

## 2024-10-07 PROCEDURE — 99396 PREV VISIT EST AGE 40-64: CPT | Performed by: NURSE PRACTITIONER

## 2024-10-07 PROCEDURE — 90471 IMMUNIZATION ADMIN: CPT | Performed by: NURSE PRACTITIONER

## 2024-10-07 PROCEDURE — 3079F DIAST BP 80-89 MM HG: CPT | Performed by: NURSE PRACTITIONER

## 2024-10-07 PROCEDURE — 90472 IMMUNIZATION ADMIN EACH ADD: CPT | Performed by: NURSE PRACTITIONER

## 2024-10-07 PROCEDURE — 90677 PCV20 VACCINE IM: CPT | Performed by: NURSE PRACTITIONER

## 2024-10-07 PROCEDURE — 90661 CCIIV3 VAC ABX FR 0.5 ML IM: CPT | Performed by: NURSE PRACTITIONER

## 2024-10-07 RX ORDER — IRBESARTAN 150 MG/1
TABLET ORAL
Qty: 90 TABLET | Refills: 1 | Status: SHIPPED | OUTPATIENT
Start: 2024-10-07

## 2024-10-07 RX ORDER — IRBESARTAN 150 MG/1
TABLET ORAL
Qty: 90 TABLET | Refills: 1 | OUTPATIENT
Start: 2024-10-07

## 2024-10-07 ASSESSMENT — ENCOUNTER SYMPTOMS
BLOOD IN STOOL: 0
WHEEZING: 0
BACK PAIN: 0
SINUS PAIN: 0
VOMITING: 0
DIARRHEA: 0
ABDOMINAL PAIN: 0
CHEST TIGHTNESS: 0
CONSTIPATION: 0
NAUSEA: 0
SHORTNESS OF BREATH: 0
COUGH: 0
SINUS PRESSURE: 0
SORE THROAT: 0
ABDOMINAL DISTENTION: 0

## 2024-10-07 ASSESSMENT — PATIENT HEALTH QUESTIONNAIRE - PHQ9
1. LITTLE INTEREST OR PLEASURE IN DOING THINGS: NOT AT ALL
5. POOR APPETITE OR OVEREATING: NOT AT ALL
2. FEELING DOWN, DEPRESSED OR HOPELESS: SEVERAL DAYS
SUM OF ALL RESPONSES TO PHQ QUESTIONS 1-9: 3
9. THOUGHTS THAT YOU WOULD BE BETTER OFF DEAD, OR OF HURTING YOURSELF: NOT AT ALL
6. FEELING BAD ABOUT YOURSELF - OR THAT YOU ARE A FAILURE OR HAVE LET YOURSELF OR YOUR FAMILY DOWN: NOT AT ALL
SUM OF ALL RESPONSES TO PHQ9 QUESTIONS 1 & 2: 1
3. TROUBLE FALLING OR STAYING ASLEEP: SEVERAL DAYS
7. TROUBLE CONCENTRATING ON THINGS, SUCH AS READING THE NEWSPAPER OR WATCHING TELEVISION: NOT AT ALL
SUM OF ALL RESPONSES TO PHQ QUESTIONS 1-9: 3
4. FEELING TIRED OR HAVING LITTLE ENERGY: SEVERAL DAYS
10. IF YOU CHECKED OFF ANY PROBLEMS, HOW DIFFICULT HAVE THESE PROBLEMS MADE IT FOR YOU TO DO YOUR WORK, TAKE CARE OF THINGS AT HOME, OR GET ALONG WITH OTHER PEOPLE: NOT DIFFICULT AT ALL
SUM OF ALL RESPONSES TO PHQ QUESTIONS 1-9: 3
SUM OF ALL RESPONSES TO PHQ QUESTIONS 1-9: 3
8. MOVING OR SPEAKING SO SLOWLY THAT OTHER PEOPLE COULD HAVE NOTICED. OR THE OPPOSITE, BEING SO FIGETY OR RESTLESS THAT YOU HAVE BEEN MOVING AROUND A LOT MORE THAN USUAL: NOT AT ALL

## 2024-10-08 LAB
EST. AVERAGE GLUCOSE BLD GHB EST-MCNC: 122.6 MG/DL
HBA1C MFR BLD: 5.9 %

## 2024-12-26 ENCOUNTER — PATIENT MESSAGE (OUTPATIENT)
Dept: PRIMARY CARE CLINIC | Age: 54
End: 2024-12-26

## 2025-04-06 NOTE — PROGRESS NOTES
Hold valsartan until BP's over 140/90, then restart.    Elevated alkaline phosphatase level    High risk medication use    Vitamin D deficiency    CKD (chronic kidney disease), stage II    Class 2 severe obesity due to excess calories with serious comorbidity and body mass index (BMI) of 36.0 to 36.9 in adult      Allergies   Allergen Reactions    Aspirin Hives     Current Outpatient Medications on File Prior to Visit   Medication Sig Dispense Refill    riTUXimab (RITUXAN IV) Infuse intravenously every other day      leucovorin calcium (WELLCOVORIN) 5 MG tablet Take 1 tablet by mouth once a week      busPIRone (BUSPAR) 10 MG tablet Take 2 tablets by mouth      methotrexate Sodium 50 MG/2ML chemo injection       Cholecalciferol 1.25 MG (39064 UT) TABS Take 50,000 Units by mouth once a week For 12 weeks then take over-the-counter vitamin D3 2000 units daily. 12 tablet 0    FLUoxetine (PROZAC) 20 MG capsule Take 2 capsules by mouth daily      topiramate (TOPAMAX) 25 MG tablet Take 1 tablet by mouth 2 times daily 60 tablet 0    tiZANidine (ZANAFLEX) 4 MG tablet Take 1 tablet by mouth daily as needed (muscle spasms) 60 tablet 0    gabapentin (NEURONTIN) 300 MG capsule Take 1 capsule by mouth nightly for 90 days. Intended supply: 90 days 90 capsule 0     No current facility-administered medications on file prior to visit.      Social History     Tobacco Use    Smoking status: Never    Smokeless tobacco: Never   Substance Use Topics    Alcohol use: Never        CARE TEAM  Patient Care Team:  Mago Cortes APRN - CNP as PCP - General (Family Nurse Practitioner)  Mago Cortes APRN - CNP as PCP - Empaneled Provider  James Yi MD (Psychiatry)  Rubia Stone MD as Consulting Physician (Rheumatology)  Aleta Skaggs MD as Consulting Physician (Nephrology)    Electronically signed by SY Jean CNP on 4/7/2025 at 9:47 AM     This dictation was generated by voice recognition computer software.  Although all attempts are made to edit the

## 2025-04-07 ENCOUNTER — OFFICE VISIT (OUTPATIENT)
Dept: PRIMARY CARE CLINIC | Age: 55
End: 2025-04-07
Payer: COMMERCIAL

## 2025-04-07 VITALS
HEART RATE: 87 BPM | TEMPERATURE: 97.4 F | OXYGEN SATURATION: 94 % | WEIGHT: 213 LBS | DIASTOLIC BLOOD PRESSURE: 80 MMHG | SYSTOLIC BLOOD PRESSURE: 138 MMHG | RESPIRATION RATE: 16 BRPM | BODY MASS INDEX: 36.56 KG/M2

## 2025-04-07 DIAGNOSIS — R73.03 PREDIABETES: Primary | ICD-10-CM

## 2025-04-07 DIAGNOSIS — R30.0 DYSURIA: ICD-10-CM

## 2025-04-07 DIAGNOSIS — E78.49 OTHER HYPERLIPIDEMIA: ICD-10-CM

## 2025-04-07 DIAGNOSIS — F41.9 ANXIETY: ICD-10-CM

## 2025-04-07 DIAGNOSIS — F33.9 RECURRENT MAJOR DEPRESSIVE DISORDER, REMISSION STATUS UNSPECIFIED: ICD-10-CM

## 2025-04-07 DIAGNOSIS — N18.2 CKD (CHRONIC KIDNEY DISEASE), STAGE II: ICD-10-CM

## 2025-04-07 DIAGNOSIS — E66.01 CLASS 2 SEVERE OBESITY DUE TO EXCESS CALORIES WITH SERIOUS COMORBIDITY AND BODY MASS INDEX (BMI) OF 36.0 TO 36.9 IN ADULT: ICD-10-CM

## 2025-04-07 DIAGNOSIS — I10 ESSENTIAL HYPERTENSION: ICD-10-CM

## 2025-04-07 DIAGNOSIS — E66.812 CLASS 2 SEVERE OBESITY DUE TO EXCESS CALORIES WITH SERIOUS COMORBIDITY AND BODY MASS INDEX (BMI) OF 36.0 TO 36.9 IN ADULT: ICD-10-CM

## 2025-04-07 DIAGNOSIS — R73.03 PREDIABETES: ICD-10-CM

## 2025-04-07 DIAGNOSIS — M05.79 RHEUMATOID ARTHRITIS INVOLVING MULTIPLE SITES WITH POSITIVE RHEUMATOID FACTOR (HCC): ICD-10-CM

## 2025-04-07 DIAGNOSIS — M79.7 FIBROMYALGIA: ICD-10-CM

## 2025-04-07 PROBLEM — F44.1: Status: RESOLVED | Noted: 2021-03-31 | Resolved: 2025-04-07

## 2025-04-07 PROBLEM — N17.9 AKI (ACUTE KIDNEY INJURY): Status: RESOLVED | Noted: 2023-03-14 | Resolved: 2025-04-07

## 2025-04-07 LAB
ALBUMIN SERPL-MCNC: 4.2 G/DL (ref 3.4–5)
ALBUMIN/GLOB SERPL: 1.8 {RATIO} (ref 1.1–2.2)
ALP SERPL-CCNC: 146 U/L (ref 40–129)
ALT SERPL-CCNC: 21 U/L (ref 10–40)
ANION GAP SERPL CALCULATED.3IONS-SCNC: 8 MMOL/L (ref 3–16)
AST SERPL-CCNC: 21 U/L (ref 15–37)
BILIRUB SERPL-MCNC: 0.5 MG/DL (ref 0–1)
BILIRUBIN, POC: NORMAL
BLOOD URINE, POC: NEGATIVE
BUN SERPL-MCNC: 11 MG/DL (ref 7–20)
CALCIUM SERPL-MCNC: 9.6 MG/DL (ref 8.3–10.6)
CHLORIDE SERPL-SCNC: 106 MMOL/L (ref 99–110)
CHOLEST SERPL-MCNC: 195 MG/DL (ref 0–199)
CLARITY, POC: CLEAR
CO2 SERPL-SCNC: 27 MMOL/L (ref 21–32)
COLOR, POC: YELLOW
CREAT SERPL-MCNC: 0.9 MG/DL (ref 0.6–1.1)
EST. AVERAGE GLUCOSE BLD GHB EST-MCNC: 125.5 MG/DL
GFR SERPLBLD CREATININE-BSD FMLA CKD-EPI: 75 ML/MIN/{1.73_M2}
GLUCOSE SERPL-MCNC: 113 MG/DL (ref 70–99)
GLUCOSE URINE, POC: NEGATIVE MG/DL
HBA1C MFR BLD: 6 %
HDLC SERPL-MCNC: 42 MG/DL (ref 40–60)
KETONES, POC: NEGATIVE MG/DL
LDLC SERPL CALC-MCNC: 119 MG/DL
LEUKOCYTE EST, POC: NORMAL
NITRITE, POC: NEGATIVE
PH, POC: 7
POTASSIUM SERPL-SCNC: 4.5 MMOL/L (ref 3.5–5.1)
PROT SERPL-MCNC: 6.6 G/DL (ref 6.4–8.2)
PROTEIN, POC: NEGATIVE MG/DL
SODIUM SERPL-SCNC: 141 MMOL/L (ref 136–145)
SPECIFIC GRAVITY, POC: 1.02
TRIGL SERPL-MCNC: 171 MG/DL (ref 0–150)
UROBILINOGEN, POC: 1 MG/DL
VLDLC SERPL CALC-MCNC: 34 MG/DL

## 2025-04-07 PROCEDURE — 99214 OFFICE O/P EST MOD 30 MIN: CPT | Performed by: NURSE PRACTITIONER

## 2025-04-07 PROCEDURE — 3079F DIAST BP 80-89 MM HG: CPT | Performed by: NURSE PRACTITIONER

## 2025-04-07 PROCEDURE — 81002 URINALYSIS NONAUTO W/O SCOPE: CPT | Performed by: NURSE PRACTITIONER

## 2025-04-07 PROCEDURE — 3075F SYST BP GE 130 - 139MM HG: CPT | Performed by: NURSE PRACTITIONER

## 2025-04-07 RX ORDER — IRBESARTAN 150 MG/1
TABLET ORAL
Qty: 90 TABLET | Refills: 1 | Status: SHIPPED | OUTPATIENT
Start: 2025-04-07

## 2025-04-07 SDOH — ECONOMIC STABILITY: FOOD INSECURITY: WITHIN THE PAST 12 MONTHS, YOU WORRIED THAT YOUR FOOD WOULD RUN OUT BEFORE YOU GOT MONEY TO BUY MORE.: NEVER TRUE

## 2025-04-07 SDOH — ECONOMIC STABILITY: FOOD INSECURITY: WITHIN THE PAST 12 MONTHS, THE FOOD YOU BOUGHT JUST DIDN'T LAST AND YOU DIDN'T HAVE MONEY TO GET MORE.: NEVER TRUE

## 2025-04-07 ASSESSMENT — PATIENT HEALTH QUESTIONNAIRE - PHQ9
5. POOR APPETITE OR OVEREATING: NOT AT ALL
8. MOVING OR SPEAKING SO SLOWLY THAT OTHER PEOPLE COULD HAVE NOTICED. OR THE OPPOSITE, BEING SO FIGETY OR RESTLESS THAT YOU HAVE BEEN MOVING AROUND A LOT MORE THAN USUAL: NOT AT ALL
6. FEELING BAD ABOUT YOURSELF - OR THAT YOU ARE A FAILURE OR HAVE LET YOURSELF OR YOUR FAMILY DOWN: NOT AT ALL
10. IF YOU CHECKED OFF ANY PROBLEMS, HOW DIFFICULT HAVE THESE PROBLEMS MADE IT FOR YOU TO DO YOUR WORK, TAKE CARE OF THINGS AT HOME, OR GET ALONG WITH OTHER PEOPLE: NOT DIFFICULT AT ALL
9. THOUGHTS THAT YOU WOULD BE BETTER OFF DEAD, OR OF HURTING YOURSELF: NOT AT ALL
SUM OF ALL RESPONSES TO PHQ QUESTIONS 1-9: 0
4. FEELING TIRED OR HAVING LITTLE ENERGY: NOT AT ALL
SUM OF ALL RESPONSES TO PHQ QUESTIONS 1-9: 0
SUM OF ALL RESPONSES TO PHQ QUESTIONS 1-9: 0
3. TROUBLE FALLING OR STAYING ASLEEP: NOT AT ALL
2. FEELING DOWN, DEPRESSED OR HOPELESS: NOT AT ALL
SUM OF ALL RESPONSES TO PHQ QUESTIONS 1-9: 0
1. LITTLE INTEREST OR PLEASURE IN DOING THINGS: NOT AT ALL
7. TROUBLE CONCENTRATING ON THINGS, SUCH AS READING THE NEWSPAPER OR WATCHING TELEVISION: NOT AT ALL

## 2025-04-07 ASSESSMENT — ENCOUNTER SYMPTOMS
ABDOMINAL DISTENTION: 0
ABDOMINAL PAIN: 0
SHORTNESS OF BREATH: 0
BLOOD IN STOOL: 0
CONSTIPATION: 0
VOMITING: 0
COUGH: 0
BACK PAIN: 1
DIARRHEA: 0
CHEST TIGHTNESS: 0
SORE THROAT: 0
SINUS PAIN: 0
NAUSEA: 0
WHEEZING: 0
SINUS PRESSURE: 0

## 2025-04-08 ENCOUNTER — RESULTS FOLLOW-UP (OUTPATIENT)
Dept: PRIMARY CARE CLINIC | Age: 55
End: 2025-04-08

## 2025-04-08 DIAGNOSIS — N39.0 UTI (URINARY TRACT INFECTION), UNCOMPLICATED: Primary | ICD-10-CM

## 2025-04-09 DIAGNOSIS — I10 ESSENTIAL HYPERTENSION: ICD-10-CM

## 2025-04-09 LAB
BACTERIA UR CULT: ABNORMAL
ORGANISM: ABNORMAL

## 2025-04-09 RX ORDER — IRBESARTAN 150 MG/1
150 TABLET ORAL NIGHTLY
Qty: 90 TABLET | Refills: 1 | OUTPATIENT
Start: 2025-04-09

## 2025-04-09 RX ORDER — NITROFURANTOIN 25; 75 MG/1; MG/1
100 CAPSULE ORAL 2 TIMES DAILY
Qty: 14 CAPSULE | Refills: 0 | Status: SHIPPED | OUTPATIENT
Start: 2025-04-09 | End: 2025-04-16